# Patient Record
Sex: MALE | Race: WHITE | NOT HISPANIC OR LATINO | Employment: FULL TIME | URBAN - METROPOLITAN AREA
[De-identification: names, ages, dates, MRNs, and addresses within clinical notes are randomized per-mention and may not be internally consistent; named-entity substitution may affect disease eponyms.]

---

## 2017-02-09 ENCOUNTER — ALLSCRIPTS OFFICE VISIT (OUTPATIENT)
Dept: OTHER | Facility: OTHER | Age: 47
End: 2017-02-09

## 2017-02-09 DIAGNOSIS — R03.0 ELEVATED BLOOD PRESSURE READING WITHOUT DIAGNOSIS OF HYPERTENSION: ICD-10-CM

## 2017-02-09 DIAGNOSIS — Z12.5 ENCOUNTER FOR SCREENING FOR MALIGNANT NEOPLASM OF PROSTATE: ICD-10-CM

## 2017-02-09 DIAGNOSIS — M54.9 DORSALGIA: ICD-10-CM

## 2017-02-09 DIAGNOSIS — R73.09 OTHER ABNORMAL GLUCOSE: ICD-10-CM

## 2017-02-09 DIAGNOSIS — R74.8 ABNORMAL LEVELS OF OTHER SERUM ENZYMES: ICD-10-CM

## 2017-02-11 ENCOUNTER — TRANSCRIBE ORDERS (OUTPATIENT)
Dept: LAB | Facility: OTHER | Age: 47
End: 2017-02-11

## 2017-03-18 ENCOUNTER — HOSPITAL ENCOUNTER (OUTPATIENT)
Dept: ULTRASOUND IMAGING | Facility: HOSPITAL | Age: 47
Discharge: HOME | End: 2017-03-18
Payer: COMMERCIAL

## 2017-03-18 DIAGNOSIS — R73.09 OTHER ABNORMAL GLUCOSE: ICD-10-CM

## 2017-03-18 DIAGNOSIS — R74.8 ABNORMAL LEVELS OF OTHER SERUM ENZYMES: ICD-10-CM

## 2017-03-18 PROCEDURE — 76705 ECHO EXAM OF ABDOMEN: CPT

## 2017-03-21 ENCOUNTER — GENERIC CONVERSION - ENCOUNTER (OUTPATIENT)
Dept: OTHER | Facility: OTHER | Age: 47
End: 2017-03-21

## 2017-03-31 ENCOUNTER — ALLSCRIPTS OFFICE VISIT (OUTPATIENT)
Dept: OTHER | Facility: OTHER | Age: 47
End: 2017-03-31

## 2017-09-08 ENCOUNTER — HOSPITAL ENCOUNTER (EMERGENCY)
Facility: HOSPITAL | Age: 47
Discharge: LEFT BEFORE DISCHARGE | End: 2017-09-08
Attending: EMERGENCY MEDICINE
Payer: COMMERCIAL

## 2017-09-08 ENCOUNTER — APPOINTMENT (EMERGENCY)
Dept: RADIOLOGY | Facility: HOSPITAL | Age: 47
End: 2017-09-08
Payer: COMMERCIAL

## 2017-09-08 VITALS
WEIGHT: 315 LBS | DIASTOLIC BLOOD PRESSURE: 77 MMHG | HEIGHT: 74 IN | HEART RATE: 72 BPM | OXYGEN SATURATION: 97 % | SYSTOLIC BLOOD PRESSURE: 143 MMHG | RESPIRATION RATE: 20 BRPM | TEMPERATURE: 98.6 F | BODY MASS INDEX: 40.43 KG/M2

## 2017-09-08 DIAGNOSIS — R07.9 CHEST PAIN: Primary | ICD-10-CM

## 2017-09-08 LAB
ALBUMIN SERPL BCP-MCNC: 4 G/DL (ref 3.5–5)
ALP SERPL-CCNC: 71 U/L (ref 46–116)
ALT SERPL W P-5'-P-CCNC: 180 U/L (ref 12–78)
ANION GAP SERPL CALCULATED.3IONS-SCNC: 9 MMOL/L (ref 4–13)
APTT PPP: 26 SECONDS (ref 23–35)
AST SERPL W P-5'-P-CCNC: 125 U/L (ref 5–45)
ATRIAL RATE: 56 BPM
BASOPHILS # BLD AUTO: 0.03 THOUSANDS/ΜL (ref 0–0.1)
BASOPHILS NFR BLD AUTO: 1 % (ref 0–1)
BILIRUB SERPL-MCNC: 1.2 MG/DL (ref 0.2–1)
BUN SERPL-MCNC: 14 MG/DL (ref 5–25)
CALCIUM SERPL-MCNC: 9 MG/DL (ref 8.3–10.1)
CHLORIDE SERPL-SCNC: 103 MMOL/L (ref 100–108)
CO2 SERPL-SCNC: 26 MMOL/L (ref 21–32)
CREAT SERPL-MCNC: 1.03 MG/DL (ref 0.6–1.3)
DEPRECATED D DIMER PPP: <270 NG/ML (FEU) (ref 0–424)
EOSINOPHIL # BLD AUTO: 0.13 THOUSAND/ΜL (ref 0–0.61)
EOSINOPHIL NFR BLD AUTO: 2 % (ref 0–6)
ERYTHROCYTE [DISTWIDTH] IN BLOOD BY AUTOMATED COUNT: 12.3 % (ref 11.6–15.1)
GFR SERPL CREATININE-BSD FRML MDRD: 85 ML/MIN/1.73SQ M
GLUCOSE SERPL-MCNC: 113 MG/DL (ref 65–140)
HCT VFR BLD AUTO: 41.3 % (ref 36.5–49.3)
HGB BLD-MCNC: 14.8 G/DL (ref 12–17)
INR PPP: 0.98 (ref 0.86–1.16)
LIPASE SERPL-CCNC: 154 U/L (ref 73–393)
LYMPHOCYTES # BLD AUTO: 1.46 THOUSANDS/ΜL (ref 0.6–4.47)
LYMPHOCYTES NFR BLD AUTO: 26 % (ref 14–44)
MCH RBC QN AUTO: 34.6 PG (ref 26.8–34.3)
MCHC RBC AUTO-ENTMCNC: 35.8 G/DL (ref 31.4–37.4)
MCV RBC AUTO: 97 FL (ref 82–98)
MONOCYTES # BLD AUTO: 0.36 THOUSAND/ΜL (ref 0.17–1.22)
MONOCYTES NFR BLD AUTO: 7 % (ref 4–12)
NEUTROPHILS # BLD AUTO: 3.55 THOUSANDS/ΜL (ref 1.85–7.62)
NEUTS SEG NFR BLD AUTO: 64 % (ref 43–75)
NRBC BLD AUTO-RTO: 0 /100 WBCS
P AXIS: 49 DEGREES
PLATELET # BLD AUTO: 149 THOUSANDS/UL (ref 149–390)
PMV BLD AUTO: 10.7 FL (ref 8.9–12.7)
POTASSIUM SERPL-SCNC: 3.9 MMOL/L (ref 3.5–5.3)
PR INTERVAL: 162 MS
PROT SERPL-MCNC: 7.6 G/DL (ref 6.4–8.2)
PROTHROMBIN TIME: 13.2 SECONDS (ref 12.1–14.4)
QRS AXIS: 44 DEGREES
QRSD INTERVAL: 92 MS
QT INTERVAL: 446 MS
QTC INTERVAL: 430 MS
RBC # BLD AUTO: 4.28 MILLION/UL (ref 3.88–5.62)
SODIUM SERPL-SCNC: 138 MMOL/L (ref 136–145)
SPECIMEN SOURCE: NORMAL
T WAVE AXIS: 12 DEGREES
TROPONIN I BLD-MCNC: 0 NG/ML (ref 0–0.08)
TROPONIN I SERPL-MCNC: 0.02 NG/ML
VENTRICULAR RATE: 56 BPM
WBC # BLD AUTO: 5.56 THOUSAND/UL (ref 4.31–10.16)

## 2017-09-08 PROCEDURE — 99285 EMERGENCY DEPT VISIT HI MDM: CPT

## 2017-09-08 PROCEDURE — 85730 THROMBOPLASTIN TIME PARTIAL: CPT | Performed by: EMERGENCY MEDICINE

## 2017-09-08 PROCEDURE — 93005 ELECTROCARDIOGRAM TRACING: CPT | Performed by: EMERGENCY MEDICINE

## 2017-09-08 PROCEDURE — 85379 FIBRIN DEGRADATION QUANT: CPT | Performed by: EMERGENCY MEDICINE

## 2017-09-08 PROCEDURE — 93005 ELECTROCARDIOGRAM TRACING: CPT

## 2017-09-08 PROCEDURE — 83690 ASSAY OF LIPASE: CPT | Performed by: EMERGENCY MEDICINE

## 2017-09-08 PROCEDURE — 71020 HB CHEST X-RAY 2VW FRONTAL&LATL: CPT

## 2017-09-08 PROCEDURE — 36415 COLL VENOUS BLD VENIPUNCTURE: CPT | Performed by: EMERGENCY MEDICINE

## 2017-09-08 PROCEDURE — 80053 COMPREHEN METABOLIC PANEL: CPT | Performed by: EMERGENCY MEDICINE

## 2017-09-08 PROCEDURE — 84484 ASSAY OF TROPONIN QUANT: CPT

## 2017-09-08 PROCEDURE — 84484 ASSAY OF TROPONIN QUANT: CPT | Performed by: EMERGENCY MEDICINE

## 2017-09-08 PROCEDURE — 85025 COMPLETE CBC W/AUTO DIFF WBC: CPT | Performed by: EMERGENCY MEDICINE

## 2017-09-08 PROCEDURE — 85610 PROTHROMBIN TIME: CPT | Performed by: EMERGENCY MEDICINE

## 2017-09-08 RX ORDER — NITROGLYCERIN 0.4 MG/1
0.4 TABLET SUBLINGUAL ONCE
Status: COMPLETED | OUTPATIENT
Start: 2017-09-08 | End: 2017-09-08

## 2017-09-08 RX ORDER — OMEPRAZOLE 10 MG/1
10 CAPSULE, DELAYED RELEASE ORAL DAILY
COMMUNITY
End: 2018-10-13 | Stop reason: HOSPADM

## 2017-09-08 RX ORDER — ASPIRIN 81 MG/1
324 TABLET, CHEWABLE ORAL ONCE
Status: COMPLETED | OUTPATIENT
Start: 2017-09-08 | End: 2017-09-08

## 2017-09-08 RX ADMIN — ASPIRIN 81 MG 324 MG: 81 TABLET ORAL at 12:30

## 2017-09-08 RX ADMIN — NITROGLYCERIN 0.4 MG: 0.4 TABLET SUBLINGUAL at 12:32

## 2018-01-11 NOTE — RESULT NOTES
Message   Liver US shows enlarged and fatty liver  Diet and exercise  When will pt be going for his labs? Verified Results  US LIVER [id number] 09:08AM [facility] Order Number: [id number]    - Patient Instructions: To schedule this appointment, please contact Central Scheduling at [phone]  Test Name Result Flag Reference   US LIVER (Report)     RIGHT UPPER QUADRANT ULTRASOUND     INDICATION: Elevated LFTs  COMPARISON: None  TECHNIQUE:  Real-time ultrasound of the right upper quadrant was performed with a curvilinear transducer with both volumetric sweeps and still imaging techniques  FINDINGS:     PANCREAS: Portions of the pancreas are obscured by bowel gas  Visualized portions of the pancreas are unremarkable  AORTA AND IVC: Visualized portions are normal for patient age  LIVER:   Size: Mildly enlarged  The liver measures 17 8 cm in the midclavicular line  Contour: Surface contour is smooth  Parenchyma: There is moderate diffuse increased echogenicity with smooth echotexture and acoustic beam attenuation  Most consistent with moderate hepatic steatosis  No evidence of suspicious mass  Limited imaging of the main portal vein shows it to be patent and hepatopedal       BILIARY:   The gallbladder is normal in caliber  No wall thickening or pericholecystic fluid  No stones or sludge identified  No sonographic Hoffman's sign  No intrahepatic biliary dilatation  CBD measures 3 mm  No choledocholithiasis  KIDNEY:    Right kidney measures 11 4 x 5 5 cm  Within normal limits  ASCITES:  None  IMPRESSION:     No acute intra-abdominal abnormality  No gallstone/sludge, pericholecystic fluid or wall thickening  Mildly enlarged fatty liver         Workstation performed: [id number]     Signed by:   Corazon Todd MD   3/20/17

## 2018-01-12 VITALS
SYSTOLIC BLOOD PRESSURE: 140 MMHG | WEIGHT: 315 LBS | DIASTOLIC BLOOD PRESSURE: 88 MMHG | RESPIRATION RATE: 16 BRPM | HEART RATE: 64 BPM

## 2018-01-14 VITALS
SYSTOLIC BLOOD PRESSURE: 148 MMHG | DIASTOLIC BLOOD PRESSURE: 90 MMHG | HEIGHT: 73 IN | RESPIRATION RATE: 16 BRPM | HEART RATE: 60 BPM | BODY MASS INDEX: 41.75 KG/M2 | WEIGHT: 315 LBS

## 2018-05-20 ENCOUNTER — OFFICE VISIT (OUTPATIENT)
Dept: URGENT CARE | Facility: CLINIC | Age: 48
End: 2018-05-20
Payer: COMMERCIAL

## 2018-05-20 VITALS
DIASTOLIC BLOOD PRESSURE: 90 MMHG | WEIGHT: 315 LBS | SYSTOLIC BLOOD PRESSURE: 150 MMHG | BODY MASS INDEX: 40.43 KG/M2 | TEMPERATURE: 98.2 F | OXYGEN SATURATION: 98 % | RESPIRATION RATE: 16 BRPM | HEIGHT: 74 IN | HEART RATE: 72 BPM

## 2018-05-20 DIAGNOSIS — J20.9 ACUTE BRONCHITIS, UNSPECIFIED ORGANISM: Primary | ICD-10-CM

## 2018-05-20 PROCEDURE — 99213 OFFICE O/P EST LOW 20 MIN: CPT | Performed by: PHYSICIAN ASSISTANT

## 2018-05-20 RX ORDER — BENZONATATE 100 MG/1
100 CAPSULE ORAL 3 TIMES DAILY PRN
Qty: 20 CAPSULE | Refills: 0 | Status: SHIPPED | OUTPATIENT
Start: 2018-05-20 | End: 2018-07-30 | Stop reason: ALTCHOICE

## 2018-05-20 RX ORDER — AZITHROMYCIN 250 MG/1
TABLET, FILM COATED ORAL
Qty: 6 TABLET | Refills: 0 | Status: SHIPPED | OUTPATIENT
Start: 2018-05-20 | End: 2018-05-24

## 2018-05-20 RX ORDER — ALBUTEROL SULFATE 90 UG/1
2 AEROSOL, METERED RESPIRATORY (INHALATION) EVERY 6 HOURS PRN
Qty: 1 INHALER | Refills: 0 | Status: SHIPPED | OUTPATIENT
Start: 2018-05-20 | End: 2018-05-27

## 2018-05-20 NOTE — PATIENT INSTRUCTIONS
1  Bronchitis  -Take antibiotic as directed with food  -Take tessalon perles as directed  -Use inhaler every 4-6 hours as needed  -Tylenol/Motrin  -Increase fluids  -Follow-up with PCP within 3-5 days  You also need to have your blood pressure re-checked by your PCP  Go to ER with worsening symptoms, fever, chest pain, shortness of breath, or any signs of distress    Acute Bronchitis   AMBULATORY CARE:   Acute bronchitis  is swelling and irritation in the air passages of your lungs  This irritation may cause you to cough or have other breathing problems  Acute bronchitis often starts because of another illness, such as a cold or the flu  The illness spreads from your nose and throat to your windpipe and airways  Bronchitis is often called a chest cold  Acute bronchitis lasts about 3 to 6 weeks and is usually not a serious illness  Your cough can last for several weeks  You may have any of the following symptoms:   · A cough with sputum that may be clear, yellow, or green    · Feeling more tired than usual, and body aches    · A fever and chills    · Wheezing when you breathe    · A tight chest or pain when you breathe or cough  Seek care immediately if:   · You cough up blood  · Your lips or fingernails turn blue  · You feel like you are not getting enough air when you breathe  Contact your healthcare provider if:   · You have a fever  · Your breathing problems do not go away or get worse  · Your cough does not get better within 4 weeks  · You have questions or concerns about your condition or care  Self-care:   · Get more rest   Rest helps your body to heal  Slowly start to do more each day  Rest when you feel it is needed  · Avoid irritants in the air  Avoid chemicals, fumes, and dust  Wear a face mask if you must work around dust or fumes  Stay inside on days when air pollution levels are high  If you have allergies, stay inside when pollen counts are high   Do not use aerosol products, such as spray-on deodorant, bug spray, and hair spray  · Do not smoke or be around others who smoke  Nicotine and other chemicals in cigarettes and cigars damages the cilia that move mucus out of your lungs  Ask your healthcare provider for information if you currently smoke and need help to quit  E-cigarettes or smokeless tobacco still contain nicotine  Talk to your healthcare provider before you use these products  · Drink liquids as directed  Liquids help keep your air passages moist and help you cough up mucus  You may need to drink more liquids when you have acute bronchitis  Ask how much liquid to drink each day and which liquids are best for you  · Use a humidifier or vaporizer  Use a cool mist humidifier or a vaporizer to increase air moisture in your home  This may make it easier for you to breathe and help decrease your cough  Prevent acute bronchitis by doing the following:   · Get the vaccinations you need  Ask your healthcare provider if you should get vaccinated against the flu or pneumonia  · Prevent the spread of germs  You can decrease your risk of acute bronchitis and other illnesses by doing the following:     [facility] [place] often with soap and water  Carry germ-killing hand lotion or gel with you  You can use the lotion or gel to clean your hands when soap and water are not available  ¨ Do not touch your eyes, nose, or mouth unless you have washed your hands first     ¨ Always cover your mouth when you cough to prevent the spread of germs  It is best to cough into a tissue or your shirt sleeve instead of into your hand  Ask those around you cover their mouths when they cough  ¨ Try to avoid people who have a cold or the flu  If you are sick, stay away from others as much as possible  Medicines: Your healthcare provider may  give you any of the following:  · Ibuprofen or acetaminophen  are medicines that help lower your fever   They are available without a doctor's order  Ask your healthcare provider which medicine is right for you  Ask how much to take and how often to take it  Follow directions  These medicines can cause stomach bleeding if not taken correctly  Ibuprofen can cause kidney damage  Do not take ibuprofen if you have kidney disease, an ulcer, or allergies to aspirin  Acetaminophen can cause liver damage  Do not take more than 4,000 milligrams in 24 hours  · Decongestants  help loosen mucus in your lungs and make it easier to cough up  This can help you breathe easier  · Cough suppressants  decrease your urge to cough  If your cough produces mucus, do not take a cough suppressant unless your healthcare provider tells you to  Your healthcare provider may suggest that you take a cough suppressant at night so you can rest     · Inhalers  may be given  Your healthcare provider may give you one or more inhalers to help you breathe easier and cough less  An inhaler gives your medicine to open your airways  Ask your healthcare provider to show you how to use your inhaler correctly  Follow up with your healthcare provider as directed:  Write down questions you have so you will remember to ask them during your follow-up visits  © 2017 [facility] Information is for End User's use only and may not be sold, redistributed or otherwise used for commercial purposes  All illustrations and images included in CareNotes® are the copyrighted property of A D A M [employer] Inc  or [employer]  The above information is an  only  It is not intended as medical advice for individual conditions or treatments  Talk to your doctor, nurse or pharmacist before following any medical regimen to see if it is safe and effective for you

## 2018-05-20 NOTE — PROGRESS NOTES
[facility]        NAME: Sunday Agrawal is a 51 y o  male  : 1970    MRN: [id number]  DATE: May 20, 2018  TIME: 9:10 AM    Assessment and Plan   Acute bronchitis, unspecified organism [J20 9]  1  Acute bronchitis, unspecified organism  azithromycin (ZITHROMAX) 250 mg tablet    albuterol (PROVENTIL HFA,VENTOLIN HFA) 90 mcg/act inhaler    benzonatate (TESSALON PERLES) 100 mg capsule         Patient Instructions     1  Bronchitis  -Take antibiotic as directed with food  -Take tessalon perles as directed  -Use inhaler every 4-6 hours as needed  -Tylenol/Motrin  -Increase fluids  -Follow-up with PCP within 3-5 days  You also need to have your blood pressure re-checked by your PCP  Go to ER with worsening symptoms, fever, chest pain, shortness of breath, or any signs of distress    Chief Complaint     Chief Complaint   Patient presents with    Cough     x 4 days, green/brown, tried nyquil/coldezz    Sore Throat     slight    sinus pressure     tried netti-pot    Nasal Congestion         History of Present Illness       The patient presents today for an evaluation of cold symptoms that started on Wednesday  The patient admits to nasal congestion, ears feeling clogged, sinus pain/pressure, sore throat, and cough  The patient states that his cough has gotten worse and that he is bringing up green/yellow mucous  The patient has been taking nyquil and OTC decongestants without relief  The patient's wife has similar symptoms  The patient's daughter also recently had bronchitis  Review of Systems   Review of Systems   Constitutional: Negative for chills and fever  HENT: Positive for congestion, ear pain, postnasal drip, rhinorrhea, sinus pain, sinus pressure and sore throat  Respiratory: Positive for cough  Negative for shortness of breath  Cardiovascular: Negative for chest pain  Neurological: Negative for headaches           Current Medications       Current Outpatient Prescriptions:   omeprazole (PriLOSEC) 10 mg delayed release capsule, Take 10 mg by mouth daily, Disp: , Rfl:     albuterol (PROVENTIL HFA,VENTOLIN HFA) 90 mcg/act inhaler, Inhale 2 puffs every 6 (six) hours as needed for wheezing for up to 7 days, Disp: 1 Inhaler, Rfl: 0    azithromycin (ZITHROMAX) 250 mg tablet, Take 2 tablets today then 1 tablet daily x 4 days, Disp: 6 tablet, Rfl: 0    benzonatate (TESSALON PERLES) 100 mg capsule, Take 1 capsule (100 mg total) by mouth 3 (three) times a day as needed for cough, Disp: 20 capsule, Rfl: 0    Escitalopram Oxalate (LEXAPRO PO), Take by mouth daily at bedtime, Disp: , Rfl:     Current Allergies     Allergies as of 05/20/2018 - Reviewed 05/20/2018   Allergen Reaction Noted    Ciprofloxacin Hives 09/08/2017            The following portions of the patient's history were reviewed and updated as appropriate: allergies, current medications, past family history, past medical history, past social history, past surgical history and problem list      Past Medical History:   Diagnosis Date    GERD (gastroesophageal reflux disease)        Past Surgical History:   Procedure Laterality Date    SKIN CANCER EXCISION         Family History   Problem Relation Age of Onset    Hypertension Mother     Hyperlipidemia Mother     Cancer Mother     Hypertension Father     Hyperlipidemia Father          Medications have been verified  Objective   /90   Pulse 72   Temp 98 2 °F (36 8 °C) (Tympanic)   Resp 16   Ht 6' 2" (1 88 m)   Wt (!) 152 kg (334 lb)   SpO2 98%   BMI 42 88 kg/m²        Physical Exam     Physical Exam   Constitutional: He is oriented to person, place, and time  He appears well-developed and well-nourished  No distress  HENT:   Right Ear: Tympanic membrane and external ear normal    Left Ear: Tympanic membrane and external ear normal    Nose: Nose normal  Right sinus exhibits no maxillary sinus tenderness and no frontal sinus tenderness   Left sinus exhibits no maxillary sinus tenderness and no frontal sinus tenderness  Mouth/Throat: Uvula is midline, oropharynx is clear and moist and mucous membranes are normal    Eyes: Conjunctivae and EOM are normal  Pupils are equal, round, and reactive to light  Neck: Normal range of motion  Neck supple  Cardiovascular: Normal rate, regular rhythm and normal heart sounds  Pulmonary/Chest: Effort normal  He has wheezes in the right lower field and the left lower field  He has rhonchi in the right lower field and the left lower field  Lymphadenopathy:     He has no cervical adenopathy  Neurological: He is alert and oriented to person, place, and time  Skin: Skin is warm and dry  Psychiatric: He has a normal mood and affect  Nursing note and vitals reviewed

## 2018-05-23 ENCOUNTER — TELEPHONE (OUTPATIENT)
Dept: FAMILY MEDICINE CLINIC | Facility: MEDICAL CENTER | Age: 48
End: 2018-05-23

## 2018-05-23 ENCOUNTER — OFFICE VISIT (OUTPATIENT)
Dept: FAMILY MEDICINE CLINIC | Facility: MEDICAL CENTER | Age: 48
End: 2018-05-23
Payer: COMMERCIAL

## 2018-05-23 ENCOUNTER — APPOINTMENT (OUTPATIENT)
Dept: RADIOLOGY | Facility: MEDICAL CENTER | Age: 48
End: 2018-05-23
Payer: COMMERCIAL

## 2018-05-23 VITALS
WEIGHT: 315 LBS | BODY MASS INDEX: 42.06 KG/M2 | TEMPERATURE: 98.4 F | OXYGEN SATURATION: 98 % | HEART RATE: 74 BPM | SYSTOLIC BLOOD PRESSURE: 140 MMHG | DIASTOLIC BLOOD PRESSURE: 94 MMHG

## 2018-05-23 DIAGNOSIS — R05.9 COUGH: Primary | ICD-10-CM

## 2018-05-23 DIAGNOSIS — R04.2 HEMOPTYSIS: ICD-10-CM

## 2018-05-23 DIAGNOSIS — R07.9 CHEST PAIN, UNSPECIFIED TYPE: Primary | ICD-10-CM

## 2018-05-23 DIAGNOSIS — R07.9 CHEST PAIN, UNSPECIFIED TYPE: ICD-10-CM

## 2018-05-23 DIAGNOSIS — R94.31 ABNORMAL FINDING ON EKG: ICD-10-CM

## 2018-05-23 DIAGNOSIS — I10 ESSENTIAL HYPERTENSION: ICD-10-CM

## 2018-05-23 PROBLEM — F41.8 DEPRESSION WITH ANXIETY: Status: ACTIVE | Noted: 2017-02-09

## 2018-05-23 PROBLEM — E66.9 OBESITY: Status: ACTIVE | Noted: 2017-03-31

## 2018-05-23 PROBLEM — R03.0 ELEVATED BP WITHOUT DIAGNOSIS OF HYPERTENSION: Status: ACTIVE | Noted: 2017-02-09

## 2018-05-23 PROBLEM — Z99.89 OSA ON CPAP: Status: ACTIVE | Noted: 2017-02-09

## 2018-05-23 PROBLEM — R74.8 ELEVATED LIVER ENZYMES: Status: ACTIVE | Noted: 2017-02-14

## 2018-05-23 PROBLEM — R16.0 ENLARGED LIVER: Status: ACTIVE | Noted: 2017-03-21

## 2018-05-23 PROBLEM — R03.0 ELEVATED BP WITHOUT DIAGNOSIS OF HYPERTENSION: Status: RESOLVED | Noted: 2017-02-09 | Resolved: 2018-05-23

## 2018-05-23 PROBLEM — R73.09 ELEVATED GLUCOSE: Status: ACTIVE | Noted: 2017-02-14

## 2018-05-23 PROBLEM — K76.0 FATTY LIVER: Status: ACTIVE | Noted: 2017-03-21

## 2018-05-23 PROBLEM — K21.9 ACID REFLUX: Status: ACTIVE | Noted: 2017-03-31

## 2018-05-23 PROBLEM — G47.33 OSA ON CPAP: Status: ACTIVE | Noted: 2017-02-09

## 2018-05-23 PROCEDURE — 71046 X-RAY EXAM CHEST 2 VIEWS: CPT

## 2018-05-23 PROCEDURE — 99213 OFFICE O/P EST LOW 20 MIN: CPT | Performed by: FAMILY MEDICINE

## 2018-05-23 PROCEDURE — 93000 ELECTROCARDIOGRAM COMPLETE: CPT | Performed by: FAMILY MEDICINE

## 2018-05-23 PROCEDURE — 71100 X-RAY EXAM RIBS UNI 2 VIEWS: CPT

## 2018-05-23 NOTE — TELEPHONE ENCOUNTER
Chest x-ray and left rib x-ray reviewed  Chest x-ray is clear and there is no fracture on left rib x-ray  No additional medication at this time

## 2018-05-23 NOTE — TELEPHONE ENCOUNTER
seen at [facility] Sunday dx with bronchitis  Put on zpack  Not much better  Now has some blood in sputum (only in am, bright red)  Some pain now behind sternum only when coughing  Bring in today? Also prescribed an inhaler and tessalon perls

## 2018-05-23 NOTE — PROGRESS NOTES
Assessment/Plan:    No problem-specific Assessment & Plan notes found for this encounter  Diagnoses and all orders for this visit:    Chest pain, unspecified type  -     POCT ECG  -     XR ribs 2 vw left; Future  Chest pain is likely pleuritic  In office EKG did not have any acute changes and therefore it is unlikely to be cardiac in origin  I did check a rib x-ray which was negative for fracture  Hemoptysis  -     XR chest pa & lateral; Future  Likely due to lung irritation from coughing  I did a chest x-ray and there was no active pulmonary disease  Patient instructed to continues antibiotics, his inhaler and his cough medication that were all prescribed by the urgent care  Abnormal finding on EKG  -     Ambulatory referral to Cardiology; Future  Patient's EKG from today is changed from his prior one from 2017  I suspect this is related to uncontrolled high blood pressure  I will have him see Cardiology for further evaluation  Essential hypertension  Blood pressure is not well controlled  Risks of high blood pressure discussed including risk of heart attack and stroke  I recommended patient start blood pressure medication but he declined at this time  He will wait to see what Cardiology has to offer  Follow-up in two weeks if symptoms persist or sooner if needed  Subjective:      Patient ID: Deepika Lynch is a 51 y o  male  Patient presents for follow-up he was recently seen in the urgent care for a cough and diagnosed with a bronchitis  He was placed on azithromycin, and albuterol inhaler and Tessalon Perles  He states his cough persists  Is been one week  He started to cough up blood three days ago  He started to have left-sided chest pain yesterday which only occurs when he coughs  He also has associated nasal congestion, headaches and ear fullness          The following portions of the patient's history were reviewed and updated as appropriate: allergies, current medications and problem list     Review of Systems   Constitutional: Negative for fever  Respiratory: Positive for cough  Negative for shortness of breath  Cardiovascular: Positive for chest pain  Objective:      /94 (BP Location: Left arm, Patient Position: Sitting, Cuff Size: Large)   Pulse 74   Temp 98 4 °F (36 9 °C)   Wt (!) 149 kg (327 lb 9 6 oz)   SpO2 98%   BMI 42 06 kg/m²          Physical Exam   Constitutional: He appears well-developed and well-nourished  Cardiovascular: Normal rate, regular rhythm and normal heart sounds      Pulmonary/Chest: Effort normal and breath sounds normal

## 2018-05-23 NOTE — TELEPHONE ENCOUNTER
I spoke with Jigar Alvarez wife, she is aware  Wants to know if a steroid would help, the tessalon pearls dont help him

## 2018-05-24 ENCOUNTER — OFFICE VISIT (OUTPATIENT)
Dept: CARDIOLOGY CLINIC | Facility: MEDICAL CENTER | Age: 48
End: 2018-05-24
Payer: COMMERCIAL

## 2018-05-24 VITALS
HEART RATE: 68 BPM | HEIGHT: 74 IN | WEIGHT: 315 LBS | DIASTOLIC BLOOD PRESSURE: 72 MMHG | BODY MASS INDEX: 40.43 KG/M2 | SYSTOLIC BLOOD PRESSURE: 146 MMHG | OXYGEN SATURATION: 97 %

## 2018-05-24 DIAGNOSIS — R94.31 ABNORMAL FINDING ON EKG: Primary | ICD-10-CM

## 2018-05-24 DIAGNOSIS — I10 ESSENTIAL HYPERTENSION: ICD-10-CM

## 2018-05-24 DIAGNOSIS — R07.89 OTHER CHEST PAIN: ICD-10-CM

## 2018-05-24 PROCEDURE — 93000 ELECTROCARDIOGRAM COMPLETE: CPT | Performed by: INTERNAL MEDICINE

## 2018-05-24 PROCEDURE — 99205 OFFICE O/P NEW HI 60 MIN: CPT | Performed by: INTERNAL MEDICINE

## 2018-05-24 RX ORDER — PREDNISONE 20 MG/1
40 TABLET ORAL DAILY
Qty: 10 TABLET | Refills: 0 | Status: SHIPPED | OUTPATIENT
Start: 2018-05-24 | End: 2018-05-29

## 2018-05-24 NOTE — TELEPHONE ENCOUNTER
Steroids may help  I faxed in prednisone 40 mg daily for five days  Patient will take 2, 20 mg tablets to make the 40 mg dose

## 2018-05-24 NOTE — PROGRESS NOTES
Cardiology Consultation     Sanjay Glasgow  [id number]  1970  [address] [facility]  [address] [facility] [address]    1  Abnormal finding on EKG  Ambulatory referral to Cardiology    POCT ECG   2  Essential hypertension     3  Other chest pain       HPI: Patient is here for evaluation and management of chest pain  He was noted to have an abnormal finding on his EKG from his PCPs office yesterday compared with prior EKG in 2017  He does note new onset of chest pain  He has been battling bronchitis  He does not have any shortness of breath, diaphoresis, lightheadedness, edema, orthopnea, or PND  Patient Active Problem List   Diagnosis    Acid reflux    Depression with anxiety    Elevated glucose    Elevated liver enzymes    Enlarged liver    Fatty liver    Obesity    KRISTEN on CPAP    Essential hypertension    Other chest pain     Past Medical History:   Diagnosis Date    GERD (gastroesophageal reflux disease)      Social History     Social History    Marital status: /Civil Union     Spouse name: N/A    Number of children: N/A    Years of education: N/A     Occupational History    Not on file       Social History Main Topics    Smoking status: Former Smoker    Smokeless tobacco: Never Used    Alcohol use Yes      Comment: socially    Drug use: No    Sexual activity: Not on file     Other Topics Concern    Not on file     Social History Narrative    No narrative on file      Family History   Problem Relation Age of Onset    Hypertension Mother     Hyperlipidemia Mother     Cancer Mother     Hypertension Father     Hyperlipidemia Father    Father and GF with stents/MI; HTN/HLD with both parents  Past Surgical History:   Procedure Laterality Date    SKIN CANCER EXCISION         Current Outpatient Prescriptions:     albuterol (PROVENTIL HFA,VENTOLIN HFA) 90 mcg/act inhaler, Inhale 2 puffs every 6 (six) hours as needed for wheezing for up to 7 days, Disp: 1 Inhaler, Rfl: 0    azithromycin (ZITHROMAX) 250 mg tablet, Take 2 tablets today then 1 tablet daily x 4 days, Disp: 6 tablet, Rfl: 0    benzonatate (TESSALON PERLES) 100 mg capsule, Take 1 capsule (100 mg total) by mouth 3 (three) times a day as needed for cough, Disp: 20 capsule, Rfl: 0    omeprazole (PriLOSEC) 10 mg delayed release capsule, Take 10 mg by mouth daily, Disp: , Rfl:     predniSONE 20 mg tablet, Take 2 tablets (40 mg total) by mouth daily for 5 days, Disp: 10 tablet, Rfl: 0  Allergies   Allergen Reactions    Ciprofloxacin Hives     Vitals:    05/24/18 1440   BP: 146/72   BP Location: Left arm   Patient Position: Sitting   Cuff Size: Adult   Pulse: 68   SpO2: 97%   Weight: (!) 149 kg (328 lb 11 2 oz)   Height: 6' 2 02" (1 88 m)       Labs:  No visits with results within 6 Month(s) from this visit     Latest known visit with results is:   Admission on 09/08/2017, Discharged on 09/08/2017   Component Date Value    Sodium 09/08/2017 138     Potassium 09/08/2017 3 9     Chloride 09/08/2017 103     CO2 09/08/2017 26     Anion Gap 09/08/2017 9     BUN 09/08/2017 14     Creatinine 09/08/2017 1 03     Glucose 09/08/2017 113     Calcium 09/08/2017 9 0     AST 09/08/2017 125*    ALT 09/08/2017 180*    Alkaline Phosphatase 09/08/2017 71     Total Protein 09/08/2017 7 6     Albumin 09/08/2017 4 0     Total Bilirubin 09/08/2017 1 20*    eGFR 09/08/2017 85     WBC 09/08/2017 5 56     RBC 09/08/2017 4 28     Hemoglobin 09/08/2017 14 8     Hematocrit 09/08/2017 41 3     MCV 09/08/2017 97     MCH 09/08/2017 34 6*    MCHC 09/08/2017 35 8     RDW 09/08/2017 12 3     MPV 09/08/2017 10 7     Platelets 67/89/1654 149     nRBC 09/08/2017 0     Neutrophils Relative 09/08/2017 64     Lymphocytes Relative 09/08/2017 26     Monocytes Relative 09/08/2017 7     Eosinophils Relative 09/08/2017 2     Basophils Relative 09/08/2017 1     Neutrophils Absolute 09/08/2017 3 55     Lymphocytes Absolute 09/08/2017 1 46     Monocytes Absolute 09/08/2017 0 36     Eosinophils Absolute 09/08/2017 0 13     Basophils Absolute 09/08/2017 0 03     Troponin I 09/08/2017 0 02     Protime 09/08/2017 13 2     INR 09/08/2017 0 98     PTT 09/08/2017 26     Lipase 09/08/2017 154     D-Dimer, Quant 09/08/2017 <270     POC Troponin I 09/08/2017 0 00     Specimen Type 09/08/2017 VENOUS     Ventricular Rate 09/08/2017 56     Atrial Rate 09/08/2017 56     DE Interval 09/08/2017 162     QRSD Interval 09/08/2017 92     QT Interval 09/08/2017 446     QTC Interval 09/08/2017 430     P Axis 09/08/2017 49     QRS Axis 09/08/2017 44     T Wave Axis 09/08/2017 12      No results found for: CHOL, TRIG, HDL, LDLDIRECT  Imaging: Xr Chest Pa & Lateral    Result Date: 5/23/2018  Narrative: CHEST INDICATION:   R04 2: Hemoptysis  COMPARISON:  9/8/2017 EXAM PERFORMED/VIEWS:  XR CHEST PA & LATERAL FINDINGS: Cardiomediastinal silhouette appears unremarkable  The lungs are clear  No pneumothorax or pleural effusion  Osseous structures appear within normal limits for patient age  Impression: No acute cardiopulmonary disease  Workstation performed: [id number]     Xr Ribs 2 Vw Left    Result Date: 5/23/2018  Narrative: LEFT RIBS INDICATION:   R07 9: Chest pain, unspecified  COMPARISON:  5/23/2018  VIEWS:  XR RIBS 2 VW LEFT FINDINGS: There is no fracture or pathologic bone lesion  Soft tissues are unremarkable  The visualized left hemithorax is unremarkable  There is no pneumothorax  Impression: No fracture  Workstation performed: [id number]       Review of Systems:  Review of Systems   Constitutional: Negative for activity change, appetite change, fatigue and fever  HENT: Negative for nosebleeds and sore throat  Eyes: Negative for photophobia and visual disturbance  Respiratory: Positive for cough and chest tightness   Negative for shortness of breath and wheezing  Cardiovascular: Negative for chest pain, palpitations and leg swelling  Gastrointestinal: Negative for abdominal pain, diarrhea, nausea and vomiting  Endocrine: Negative for polyuria  Genitourinary: Negative for dysuria, frequency and hematuria  Musculoskeletal: Negative for arthralgias, back pain and gait problem  Skin: Negative for pallor and rash  Neurological: Negative for dizziness, syncope, speech difficulty and light-headedness  Hematological: Does not bruise/bleed easily  Psychiatric/Behavioral: Negative for agitation, behavioral problems and confusion  Physical Exam:  Physical Exam   Constitutional: He is oriented to person, place, and time  He appears well-developed and well-nourished  No distress  HENT:   Head: Normocephalic and atraumatic  Nose: Nose normal    Eyes: EOM are normal  Pupils are equal, round, and reactive to light  No scleral icterus  Neck: Normal range of motion  Neck supple  No JVD present  Cardiovascular: Normal rate, regular rhythm, S1 normal and S2 normal   Exam reveals no gallop, no S3, no distant heart sounds and no friction rub  No murmur heard  No systolic murmur is present   Pulmonary/Chest: Effort normal and breath sounds normal  No respiratory distress  He has no wheezes  He has no rales  Abdominal: Soft  Bowel sounds are normal  He exhibits no distension  Musculoskeletal: He exhibits no edema or deformity  Neurological: He is alert and oriented to person, place, and time  No cranial nerve deficit  Skin: Skin is warm and dry  He is not diaphoretic  No erythema  Psychiatric: He has a normal mood and affect  His behavior is normal    Vitals reviewed  EKG:  Normal sinus rhythm  Normal ECG    Discussion/Summary:  Chest pain: repeated EKG today, which was normal   I'm unclear as to what the abnormality on EKG done yesterday was since I am unable to view that EKG    With risk factors for CAD and symptoms - will screen with treadmill stress testing  Will also check an echo to evaluate for structural heart disease  Uncontrolled HTN:  Perhaps related to steroid use  Today's value is not terrible, continue to watch

## 2018-05-24 NOTE — LETTER
May 24, 2018     Kristen Holman, DO  [address]  [facility] [address]    Patient: Asa More   YOB: 1970   Date of Visit: 5/24/2018       Dear Dr Daisy Knox: Thank you for referring Asa More to me for evaluation  Below are my notes for this consultation  If you have questions, please do not hesitate to call me  I look forward to following your patient along with you           Sincerely,        Camryn Cueva MD        CC: No Recipients

## 2018-05-24 NOTE — PATIENT INSTRUCTIONS
Chest Pain   AMBULATORY CARE:   Chest pain  can be caused by a range of conditions, from not serious to life-threatening  It may be caused by a heart attack or a blood clot in your lungs  Sometimes chest pain or pressure is caused by poor blood flow to your heart (angina)  Infection, inflammation, or a fracture in the bones or cartilage in your chest can cause pain or discomfort  Chest pain can also be a symptom of a digestive problem, such as acid reflux or a stomach ulcer  An anxiety attack or a strong emotion such as anger can also cause chest pain  It is important to follow up with your healthcare provider to find the cause of your chest pain  Common symptoms you may have with chest pain:   · Fever or sweating     · Nausea or vomiting     · Shortness of breath     · Discomfort or pressure that spreads from your chest to your back, jaw, or arm     · A racing or slow heartbeat     · Feeling weak, tired, or faint  Call 911 if:   · You have any of the following signs of a heart attack:      ¨ Squeezing, pressure, or pain in your chest that lasts longer than 5 minutes or returns    ¨ Discomfort or pain in your back, neck, jaw, stomach, or arm     ¨ Trouble breathing    ¨ Nausea or vomiting    ¨ Lightheadedness or a sudden cold sweat, especially with chest pain or trouble breathing    Seek care immediately if:   · You have chest discomfort that gets worse, even with medicine  · You cough or vomit blood  · Your bowel movements are black or bloody  · You cannot stop vomiting, or it hurts to swallow  Contact your healthcare provider if:   · You have questions or concerns about your condition or care  Treatment for chest pain  may include medicine to treat your symptoms while your healthcare provider finds the cause of your chest pain  · Medicines  may be given to treat the cause of your chest pain  Examples include pain medicine, anxiety medicine, or medicines to increase blood flow to your heart  · Do not take certain medicines without asking your healthcare provider first   These include NSAIDs, herbal or vitamin supplements, or hormones (estrogen or progestin)  Follow up with your healthcare provider within 72 hours, or as directed: You may need to return for more tests to find the cause of your chest pain  You may be referred to a specialist, such as a cardiologist or gastroenterologist  Write down your questions so you remember to ask them during your visits  Healthy living tips: The following are general healthy guidelines  If your chest pain is caused by a heart problem, your healthcare provider will give you specific guidelines to follow  · Do not smoke  Nicotine and other chemicals in cigarettes and cigars can cause lung and heart damage  Ask your healthcare provider for information if you currently smoke and need help to quit  E-cigarettes or smokeless tobacco still contain nicotine  Talk to your healthcare provider before you use these products  · Eat a variety of healthy, low-fat foods  Healthy foods include fruits, vegetables, whole-grain breads, low-fat dairy products, beans, lean meats, and fish  Ask for more information about a heart healthy diet  · Ask about activity  Your healthcare provider will tell you which activities to limit or avoid  Ask when you can drive, return to work, and have sex  Ask about the best exercise plan for you  · Maintain a healthy weight  Ask your healthcare provider how much you should weigh  Ask him or her to help you create a weight loss plan if you are overweight  · Get the flu and pneumonia vaccines  All adults should get the influenza (flu) vaccine  Get it every year as soon as it becomes available  The pneumococcal vaccine is given to adults aged 72 years or older  The vaccine is given every 5 years to prevent pneumococcal disease, such as pneumonia    © 2017 [employer] Information is for End User's use only and may not be sold, redistributed or otherwise used for commercial purposes  All illustrations and images included in CareNotes® are the copyrighted property of [employer]  or Jc [employer]  The above information is an  only  It is not intended as medical advice for individual conditions or treatments  Talk to your doctor, nurse or pharmacist before following any medical regimen to see if it is safe and effective for you

## 2018-07-11 ENCOUNTER — HOSPITAL ENCOUNTER (OUTPATIENT)
Dept: NON INVASIVE DIAGNOSTICS | Facility: CLINIC | Age: 48
Discharge: HOME | End: 2018-07-11
Payer: COMMERCIAL

## 2018-07-11 DIAGNOSIS — I10 ESSENTIAL HYPERTENSION: ICD-10-CM

## 2018-07-11 DIAGNOSIS — R07.89 OTHER CHEST PAIN: ICD-10-CM

## 2018-07-11 DIAGNOSIS — R94.31 ABNORMAL FINDING ON EKG: ICD-10-CM

## 2018-07-11 PROCEDURE — 93306 TTE W/DOPPLER COMPLETE: CPT

## 2018-07-11 PROCEDURE — 93018 CV STRESS TEST I&R ONLY: CPT | Performed by: INTERNAL MEDICINE

## 2018-07-11 PROCEDURE — 93017 CV STRESS TEST TRACING ONLY: CPT

## 2018-07-11 PROCEDURE — 93016 CV STRESS TEST SUPVJ ONLY: CPT | Performed by: INTERNAL MEDICINE

## 2018-07-11 PROCEDURE — 93306 TTE W/DOPPLER COMPLETE: CPT | Performed by: INTERNAL MEDICINE

## 2018-07-12 LAB
CHEST PAIN STATEMENT: NORMAL
MAX DIASTOLIC BP: 82 MMHG
MAX HEART RATE: 160 BPM
MAX PREDICTED HEART RATE: 171 BPM
MAX. SYSTOLIC BP: 188 MMHG
PROTOCOL NAME: NORMAL
REASON FOR TERMINATION: NORMAL
TARGET HR FORMULA: NORMAL
TIME IN EXERCISE PHASE: NORMAL

## 2018-07-30 ENCOUNTER — OFFICE VISIT (OUTPATIENT)
Dept: CARDIOLOGY CLINIC | Facility: MEDICAL CENTER | Age: 48
End: 2018-07-30
Payer: COMMERCIAL

## 2018-07-30 VITALS
DIASTOLIC BLOOD PRESSURE: 74 MMHG | WEIGHT: 315 LBS | HEART RATE: 80 BPM | BODY MASS INDEX: 40.43 KG/M2 | SYSTOLIC BLOOD PRESSURE: 126 MMHG | OXYGEN SATURATION: 98 % | HEIGHT: 74 IN

## 2018-07-30 DIAGNOSIS — R07.89 OTHER CHEST PAIN: ICD-10-CM

## 2018-07-30 DIAGNOSIS — I10 ESSENTIAL HYPERTENSION: Primary | ICD-10-CM

## 2018-07-30 PROCEDURE — 99214 OFFICE O/P EST MOD 30 MIN: CPT | Performed by: INTERNAL MEDICINE

## 2018-07-30 NOTE — PATIENT INSTRUCTIONS
Low-Sodium Diet   AMBULATORY CARE:   A low-sodium diet  limits foods that are high in sodium (salt)  You will need to follow a low-sodium diet if you have high blood pressure, kidney disease, or heart failure  You may also need to follow this diet if you have a condition that is causing your body to retain (hold) extra fluid  You may need to limit the amount of sodium you eat to 1,500 mg  Ask your healthcare provider how much sodium you can have each day  How to use food labels to choose foods that are low in sodium:  Read food labels to find the amount of sodium they contain  The amount of sodium is listed in milligrams (mg)  The % Daily Value (DV) column tells you how much of your daily needs are met by 1 serving of the food for each nutrient listed  Choose foods that have less than 5% of the DV of sodium  These foods are considered low in sodium  Foods that have 20% or more of the DV of sodium are considered high in sodium  Some food labels may also list any of the following terms that tell you about the sodium content in the food:  · Sodium-free:  Less than 5 mg in each serving    · Very low sodium:  35 mg of sodium or less in each serving    · Low sodium:  140 mg of sodium or less in each serving    · Reduced sodium: At least 25% less sodium in each serving than the regular type    · Light in sodium:  50% less sodium in each serving    · Unsalted or no added salt:  No extra salt is added during processing (the food may still contain sodium)  Foods to avoid:  Salty foods are high in sodium   You should avoid the following:  · Processed foods:      ¨ Mixes for cornbread, biscuits, cake, and pudding     ¨ Instant foods, such as potatoes, cereals, noodles, and rice     ¨ Packaged foods, such as bread stuffing, rice and pasta mixes, snack dip mixes, and macaroni and cheese     ¨ Canned foods, such as canned vegetables, soups, broths, sauces, and vegetable or tomato juice    ¨ Snack foods, such as salted chips, popcorn, pretzels, pork rinds, salted crackers, and salted nuts    ¨ Frozen foods, such as dinners, entrees, vegetables with sauces, and breaded meats    ¨ Sauerkraut, pickled vegetables, and other foods prepared in brine    · Meats and cheeses:      ¨ Smoked or cured meat, such as corned beef, heller, ham, hot dogs, and sausage    ¨ Canned meats or spreads, such as potted meats, sardines, anchovies, and imitation seafood    ¨ Deli or lunch meats, such as bologna, ham, turkey, and roast beef    ¨ Processed cheese, such as American cheese and cheese spreads    · Condiments, sauces, and seasonings:      ¨ Salt (¼ teaspoon of salt contains 575 mg of sodium)    ¨ Seasonings made with salt, such as garlic salt, celery salt, onion salt, and seasoned salt    ¨ Regular soy sauce, barbecue sauce, teriyaki sauce, steak sauce, Worcestershire sauce, and most flavored vinegars    ¨ Canned gravy and mixes     ¨ Regular condiments, such as mustard, ketchup, and salad dressings    ¨ Pickles and olives    ¨ Meat tenderizers and monosodium glutamate (MSG)  Foods to include:  Read the food label to find the exact amount of sodium in each serving  · Bread and cereal:  Try to choose breads with less than 80 mg of sodium per serving  ¨ Bread, roll, cynthia, tortilla, or unsalted crackers  ¨ Ready-to-eat cereals with less than 5% DV of sodium (examples include shredded wheat and puffed rice)    ¨ Pasta    · Vegetables and fruits:      ¨ Unsalted fresh, frozen, or canned vegetables    ¨ Fresh, frozen, or canned fruits    ¨ Fruit juice    · Dairy:  One serving has about 150 mg of sodium  ¨ Milk, all types    ¨ Yogurt    ¨ Hard cheese, such as cheddar, Swiss, [employer], or mozzarella    · Meat and other protein foods:  Some raw meats may have added sodium       ¨ Plain meats, fish, and poultry     ¨ Eggs    · Other foods:      ¨ Homemade pudding    ¨ Unsalted nuts, popcorn, or pretzels    ¨ Unsalted butter or margarine  Ways to decrease sodium:   · Add spices and herbs to foods instead of salt during cooking  Use salt-free seasonings to add flavor to foods  Examples include onion powder, garlic powder, basil, marrero powder, paprika, and parsley  Try lemon or lime juice or vinegar to give foods a tart flavor  Use hot peppers, pepper, or cayenne pepper to add a spicy flavor to foods  · Do not keep a salt shaker at your kitchen table  This may help keep you from adding salt to food at the table  It may take time to get used to enjoying the natural flavor of food instead of adding salt  Talk to your healthcare provider before you use salt substitutes  Some salt substitutes have a high amount of potassium and need to be avoided if you have kidney disease  · Choose low-sodium foods at restaurants  Meals from restaurants are often high in sodium  Some restaurants have nutrition information on the menu that tells you the amount of sodium in their foods  If possible, ask for your food to be prepared with less, or no salt  · Shop for unsalted or low-sodium foods and snacks at the grocery store  Examples include unsalted or low-sodium broths, soups, and canned vegetables  Choose fresh or frozen vegetables instead  Choose unsalted nuts or seeds or fresh fruits or vegetables as snacks  Read food labels and choose salt-free, very low-sodium, or low-sodium foods  © 2017 [employer] Information is for End User's use only and may not be sold, redistributed or otherwise used for commercial purposes  All illustrations and images included in CareNotes® are the copyrighted property of [employer]  or Jc Malcolm  The above information is an  only  It is not intended as medical advice for individual conditions or treatments  Talk to your doctor, nurse or pharmacist before following any medical regimen to see if it is safe and effective for you

## 2018-07-30 NOTE — LETTER
July 30, 2018     Mechelle Noonan DO  [address]  [facility] [address]    Patient: Tommie Wagner   YOB: 1970   Date of Visit: 7/30/2018       Dear Dr Hyatt Shows: Thank you for referring Tommie Wagner to me for evaluation  Below are my notes for this consultation  If you have questions, please do not hesitate to call me  I look forward to following your patient along with you           Sincerely,        Hyacinth Reeder MD        CC: No Recipients

## 2018-07-30 NOTE — PROGRESS NOTES
Cardiology Consultation     Myke Sparks  [id number]  1970  [address] [facility] [place]  [address] [facility] [address]    1  Essential hypertension     2  Other chest pain       Chief Complaint   Patient presents with    Follow-up     4 wk w stress and ECHO  Patient has no cardiac complaints at this time  HPI: Patient is here for evaluation and management of chest pain  He was noted to have an abnormal finding on his EKG from his PCPs office compared with prior EKG in 2017  He does note new onset of chest pain  He has been battling bronchitis  He does not have any shortness of breath, diaphoresis, lightheadedness, edema, orthopnea, or PND  No further complaints since last visit  Patient Active Problem List   Diagnosis    Acid reflux    Depression with anxiety    Elevated glucose    Elevated liver enzymes    Enlarged liver    Fatty liver    Obesity    KRISTEN on CPAP    Essential hypertension    Other chest pain     Past Medical History:   Diagnosis Date    GERD (gastroesophageal reflux disease)      Social History     Social History    Marital status: /Civil Union     Spouse name: N/A    Number of children: N/A    Years of education: N/A     Occupational History    Not on file       Social History Main Topics    Smoking status: Former Smoker    Smokeless tobacco: Never Used    Alcohol use Yes      Comment: socially    Drug use: No    Sexual activity: Not on file     Other Topics Concern    Not on file     Social History Narrative    No narrative on file      Family History   Problem Relation Age of Onset    Hypertension Mother     Hyperlipidemia Mother     Cancer Mother     Hypertension Father     Hyperlipidemia Father    Father and GF with stents/MI; HTN/HLD with both parents  Past Surgical History:   Procedure Laterality Date    SKIN CANCER EXCISION         Current Outpatient Prescriptions:     omeprazole (PriLOSEC) 10 mg delayed release capsule, Take 10 mg by mouth daily, Disp: , Rfl:   Allergies   Allergen Reactions    Ciprofloxacin Hives     Vitals:    07/30/18 1435   BP: 126/74   BP Location: Right arm   Patient Position: Sitting   Cuff Size: Large   Pulse: 80   SpO2: 98%   Weight: (!) 152 kg (335 lb 9 6 oz)   Height: 6' 2" (1 88 m)       Labs:  Hospital Outpatient Visit on 07/11/2018   Component Date Value    Protocol Name 07/11/2018 REBECCA     Time In Exercise Phase 07/11/2018 00:10:30     MAX  SYSTOLIC BP 12/23/5684 028     Max Diastolic Bp 21/57/3440 82     Max Heart Rate 07/11/2018 160     Max Predicted Heart Rate 07/11/2018 171     Reason for Termination 07/11/2018 Dyspnea     Test Indication 07/11/2018                      Value:Abnormal ECG  Chest Discomfort      Target Hr Formular 07/11/2018 (220 - Age)*100%     Chest Pain Statement 07/11/2018 none      No results found for: CHOL, TRIG, HDL, LDLDIRECT  Imaging: Xr Chest Pa & Lateral    Result Date: 5/23/2018  Narrative: CHEST INDICATION:   R04 2: Hemoptysis  COMPARISON:  9/8/2017 EXAM PERFORMED/VIEWS:  XR CHEST PA & LATERAL FINDINGS: Cardiomediastinal silhouette appears unremarkable  The lungs are clear  No pneumothorax or pleural effusion  Osseous structures appear within normal limits for patient age  Impression: No acute cardiopulmonary disease  Workstation performed: [id number]     Xr Ribs 2 Vw Left    Result Date: 5/23/2018  Narrative: LEFT RIBS INDICATION:   R07 9: Chest pain, unspecified  COMPARISON:  5/23/2018  VIEWS:  XR RIBS 2 VW LEFT FINDINGS: There is no fracture or pathologic bone lesion  Soft tissues are unremarkable  The visualized left hemithorax is unremarkable  There is no pneumothorax  Impression: No fracture  Workstation performed: [id number]       Review of Systems:  Review of Systems   Constitutional: Negative for activity change, appetite change, fatigue and fever     HENT: Negative for nosebleeds and sore throat  Eyes: Negative for photophobia and visual disturbance  Respiratory: Positive for cough and chest tightness  Negative for shortness of breath and wheezing  Cardiovascular: Negative for chest pain, palpitations and leg swelling  Gastrointestinal: Negative for abdominal pain, diarrhea, nausea and vomiting  Endocrine: Negative for polyuria  Genitourinary: Negative for dysuria, frequency and hematuria  Musculoskeletal: Negative for arthralgias, back pain and gait problem  Skin: Negative for pallor and rash  Neurological: Negative for dizziness, syncope, speech difficulty and light-headedness  Hematological: Does not bruise/bleed easily  Psychiatric/Behavioral: Negative for agitation, behavioral problems and confusion  Physical Exam:  Physical Exam   Constitutional: He is oriented to person, place, and time  He appears well-developed and well-nourished  No distress  HENT:   Head: Normocephalic and atraumatic  Nose: Nose normal    Eyes: EOM are normal  Pupils are equal, round, and reactive to light  No scleral icterus  Neck: Normal range of motion  Neck supple  No JVD present  Cardiovascular: Normal rate, regular rhythm, S1 normal and S2 normal   Exam reveals no gallop, no S3, no distant heart sounds and no friction rub  No murmur heard  No systolic murmur is present   Pulmonary/Chest: Effort normal and breath sounds normal  No respiratory distress  He has no wheezes  He has no rales  Abdominal: Soft  Bowel sounds are normal  He exhibits no distension  Musculoskeletal: He exhibits no edema or deformity  Neurological: He is alert and oriented to person, place, and time  No cranial nerve deficit  Skin: Skin is warm and dry  He is not diaphoretic  No erythema  Psychiatric: He has a normal mood and affect  His behavior is normal    Vitals reviewed      EKG:  Normal sinus rhythm  Normal ECG    Discussion/Summary:  Chest pain: repeated EKG today, which was normal   I'm unclear as to what the abnormality on EKG done yesterday was since I am unable to view that EKG  With risk factors for CAD and symptoms - we screened with treadmill stress testing - which was normal   We also checked an echo to evaluate for structural heart disease, which was also normal      Uncontrolled HTN:  Perhaps related to steroid use  Today's value is improved - continue to monitor

## 2018-09-19 ENCOUNTER — TELEPHONE (OUTPATIENT)
Dept: GASTROENTEROLOGY | Facility: CLINIC | Age: 48
End: 2018-09-19

## 2018-09-20 PROBLEM — K21.9 GASTROESOPHAGEAL REFLUX DISEASE: Status: ACTIVE | Noted: 2018-09-20

## 2018-10-01 ENCOUNTER — APPOINTMENT (OUTPATIENT)
Dept: RADIOLOGY | Facility: CLINIC | Age: 48
End: 2018-10-01
Payer: COMMERCIAL

## 2018-10-01 ENCOUNTER — TRANSCRIBE ORDERS (OUTPATIENT)
Dept: ADMINISTRATIVE | Facility: HOSPITAL | Age: 48
End: 2018-10-01

## 2018-10-01 DIAGNOSIS — G89.29 CHRONIC HEEL PAIN, LEFT: ICD-10-CM

## 2018-10-01 DIAGNOSIS — G89.29 CHRONIC HEEL PAIN, LEFT: Primary | ICD-10-CM

## 2018-10-01 DIAGNOSIS — M79.672 CHRONIC HEEL PAIN, LEFT: Primary | ICD-10-CM

## 2018-10-01 DIAGNOSIS — M79.672 CHRONIC HEEL PAIN, LEFT: ICD-10-CM

## 2018-10-01 PROCEDURE — 73650 X-RAY EXAM OF HEEL: CPT

## 2018-10-13 ENCOUNTER — HOSPITAL ENCOUNTER (OUTPATIENT)
Facility: HOSPITAL | Age: 48
Setting detail: OUTPATIENT SURGERY
Discharge: HOME | End: 2018-10-13
Attending: INTERNAL MEDICINE | Admitting: INTERNAL MEDICINE
Payer: COMMERCIAL

## 2018-10-13 ENCOUNTER — ANESTHESIA EVENT (OUTPATIENT)
Dept: PERIOP | Facility: HOSPITAL | Age: 48
End: 2018-10-13
Payer: COMMERCIAL

## 2018-10-13 ENCOUNTER — ANESTHESIA (OUTPATIENT)
Dept: PERIOP | Facility: HOSPITAL | Age: 48
End: 2018-10-13
Payer: COMMERCIAL

## 2018-10-13 VITALS
TEMPERATURE: 98.2 F | DIASTOLIC BLOOD PRESSURE: 92 MMHG | OXYGEN SATURATION: 94 % | SYSTOLIC BLOOD PRESSURE: 139 MMHG | BODY MASS INDEX: 40.43 KG/M2 | HEIGHT: 74 IN | RESPIRATION RATE: 20 BRPM | WEIGHT: 315 LBS | HEART RATE: 63 BPM

## 2018-10-13 DIAGNOSIS — K21.9 GASTROESOPHAGEAL REFLUX DISEASE, ESOPHAGITIS PRESENCE NOT SPECIFIED: ICD-10-CM

## 2018-10-13 DIAGNOSIS — K21.9 GASTROESOPHAGEAL REFLUX DISEASE WITHOUT ESOPHAGITIS: Primary | ICD-10-CM

## 2018-10-13 PROCEDURE — 88305 TISSUE EXAM BY PATHOLOGIST: CPT | Performed by: PATHOLOGY

## 2018-10-13 PROCEDURE — 88342 IMHCHEM/IMCYTCHM 1ST ANTB: CPT | Performed by: PATHOLOGY

## 2018-10-13 PROCEDURE — 43239 EGD BIOPSY SINGLE/MULTIPLE: CPT | Performed by: INTERNAL MEDICINE

## 2018-10-13 RX ORDER — PANTOPRAZOLE SODIUM 40 MG/1
40 TABLET, DELAYED RELEASE ORAL DAILY
Qty: 30 TABLET | Refills: 3 | Status: SHIPPED | OUTPATIENT
Start: 2018-10-13 | End: 2019-02-12 | Stop reason: SDUPTHER

## 2018-10-13 RX ORDER — LIDOCAINE HYDROCHLORIDE 10 MG/ML
INJECTION, SOLUTION EPIDURAL; INFILTRATION; INTRACAUDAL; PERINEURAL AS NEEDED
Status: DISCONTINUED | OUTPATIENT
Start: 2018-10-13 | End: 2018-10-13 | Stop reason: SURG

## 2018-10-13 RX ORDER — SODIUM CHLORIDE, SODIUM LACTATE, POTASSIUM CHLORIDE, CALCIUM CHLORIDE 600; 310; 30; 20 MG/100ML; MG/100ML; MG/100ML; MG/100ML
125 INJECTION, SOLUTION INTRAVENOUS CONTINUOUS
Status: DISCONTINUED | OUTPATIENT
Start: 2018-10-13 | End: 2018-10-13 | Stop reason: HOSPADM

## 2018-10-13 RX ORDER — PROPOFOL 10 MG/ML
INJECTION, EMULSION INTRAVENOUS AS NEEDED
Status: DISCONTINUED | OUTPATIENT
Start: 2018-10-13 | End: 2018-10-13 | Stop reason: SURG

## 2018-10-13 RX ORDER — IBUPROFEN 200 MG
TABLET ORAL EVERY 6 HOURS PRN
COMMUNITY
End: 2020-05-13

## 2018-10-13 RX ADMIN — LIDOCAINE HYDROCHLORIDE 20 MG: 10 INJECTION, SOLUTION EPIDURAL; INFILTRATION; INTRACAUDAL; PERINEURAL at 09:12

## 2018-10-13 RX ADMIN — PROPOFOL 100 MG: 10 INJECTION, EMULSION INTRAVENOUS at 09:13

## 2018-10-13 RX ADMIN — PROPOFOL 100 MG: 10 INJECTION, EMULSION INTRAVENOUS at 09:12

## 2018-10-13 RX ADMIN — PROPOFOL 50 MG: 10 INJECTION, EMULSION INTRAVENOUS at 09:14

## 2018-10-13 RX ADMIN — PROPOFOL 30 MG: 10 INJECTION, EMULSION INTRAVENOUS at 09:15

## 2018-10-13 RX ADMIN — SODIUM CHLORIDE, SODIUM LACTATE, POTASSIUM CHLORIDE, AND CALCIUM CHLORIDE: .6; .31; .03; .02 INJECTION, SOLUTION INTRAVENOUS at 08:39

## 2018-10-13 NOTE — DISCHARGE INSTRUCTIONS
Upper Endoscopy   WHAT YOU NEED TO KNOW:   An upper endoscopy is also called an upper gastrointestinal (GI) endoscopy, or an esophagogastroduodenoscopy (EGD)  You may feel bloated, gassy, or have some abdominal discomfort after your procedure  Your throat may be sore for 24 to 36 hours  You may burp or pass gas from air that is still inside your body  DISCHARGE INSTRUCTIONS:   Call 911 if:   · You have sudden chest pain or trouble breathing  Seek care immediately if:   · You feel dizzy or faint  · You have trouble swallowing  · You have severe throat pain  · Your bowel movements are very dark or black  · Your abdomen is hard and firm and you have severe pain  · You vomit blood  Contact your healthcare provider if:   · You feel full or bloated and cannot burp or pass gas  · You have not had a bowel movement for 3 days after your procedure  · You have neck pain  · You have a fever or chills  · You have nausea or are vomiting  · You have a rash or hives  · You have questions or concerns about your endoscopy  Relieve a sore throat:  Suck on throat lozenges or crushed ice  Gargle with a small amount of warm salt water  Mix 1 teaspoon of salt and 1 cup of warm water to make salt water  Relieve gas and discomfort from bloating:  Lie on your right side with a heating pad on your abdomen  Take short walks to help pass gas  Eat small meals until bloating is relieved  Rest after your procedure:  Do not drive or make important decisions until the day after your procedure  Return to your normal activity as directed  You can usually return to work the day after your procedure  Follow up with your healthcare provider as directed:  Write down your questions so you remember to ask them during your visits  © 2017 [employer]  Information is for End User's use only and may not be sold, redistributed or otherwise used for commercial purposes   All illustrations and images included in Sarita are the copyrighted property of [employer]  or Jc Malcolm  The above information is an  only  It is not intended as medical advice for individual conditions or treatments  Talk to your doctor, nurse or pharmacist before following any medical regimen to see if it is safe and effective for you

## 2018-10-13 NOTE — DISCHARGE INSTR - AVS FIRST PAGE
ESOPHAGOGASTRODUODENOSCOPY    PROCEDURE: EGD/ Biopsy    INDICATIONS: GERD    POST-OP DIAGNOSIS: See the impression below    SEDATION: Monitored anesthesia care, check anesthesia records    PHYSICAL EXAM:  Vitals:    10/13/18 0832   BP: 169/97   Pulse: 70   Resp: 19   Temp: 98 2 °F (36 8 °C)   SpO2: 96%     Body mass index is 43 14 kg/m²  General: NAD  Heart: S1 & S2 normal, RRR  Lungs: CTA, No rales or rhonchi  Abdomen: Soft, nontender, nondistended, good bowel sounds    CONSENT:  Informed consent was obtained for the procedure, including sedation after explaining the risks and benefits of the procedure  Risks including but not limited to bleeding, perforation, infection, aspiration were discussed in detail  Also explained about less than 100% sensitivity with the exam and other alternatives  PREPARATION:   EKG tracing, pulse oximetry, blood pressure were monitored throughout the procedure  Patient was identified by myself both verbally and by visual inspection of ID band  DESCRIPTION:   Patient was placed in the left lateral decubitus position and was sedated with the above medication  The gastroscope was introduced in to the oropharynx and the esophagus was intubated under direct visualization  Scope was passed down the esophagus up to 2nd part of the duodenum  A careful inspection was made as the gastroscope was withdrawn, including a retroflexed view of the stomach; findings and interventions are described below  FINDINGS:    #1  Esophagus and GEJ- Two 1 cm projections of Jack's mucosa were noted at the GE junction  Multiple biopsies were obtained  A small 1 cm hiatal hernia was noted    #2  Stomach- the cardia fundus body and antrum were normal   Multiple biopsies were obtained  #3  Duodenum- the bulb and 2nd portion were normal   Multiple biopsies were obtained           IMPRESSIONS:    Jack's esophagus short-segment  1 cm hiatal hernia    RECOMMENDATIONS:   Will Protonix 40 mg daily for 1 year  EGD in 1 year  Reflux precautions  Weight loss  Await pathology    COMPLICATIONS:  None; patient tolerated the procedure well    DISPOSITION: PACU  CONDITION: Stable    Darlin Bernard MD  10/13/2018,9:18 AM

## 2018-10-13 NOTE — OP NOTE
ESOPHAGOGASTRODUODENOSCOPY    PROCEDURE: EGD/ Biopsy    INDICATIONS: GERD    POST-OP DIAGNOSIS: See the impression below    SEDATION: Monitored anesthesia care, check anesthesia records    PHYSICAL EXAM:  Vitals:    10/13/18 0832   BP: 169/97   Pulse: 70   Resp: 19   Temp: 98 2 °F (36 8 °C)   SpO2: 96%     Body mass index is 43 14 kg/m²  General: NAD  Heart: S1 & S2 normal, RRR  Lungs: CTA, No rales or rhonchi  Abdomen: Soft, nontender, nondistended, good bowel sounds    CONSENT:  Informed consent was obtained for the procedure, including sedation after explaining the risks and benefits of the procedure  Risks including but not limited to bleeding, perforation, infection, aspiration were discussed in detail  Also explained about less than 100% sensitivity with the exam and other alternatives  PREPARATION:   EKG tracing, pulse oximetry, blood pressure were monitored throughout the procedure  Patient was identified by myself both verbally and by visual inspection of ID band  DESCRIPTION:   Patient was placed in the left lateral decubitus position and was sedated with the above medication  The gastroscope was introduced in to the oropharynx and the esophagus was intubated under direct visualization  Scope was passed down the esophagus up to 2nd part of the duodenum  A careful inspection was made as the gastroscope was withdrawn, including a retroflexed view of the stomach; findings and interventions are described below  FINDINGS:    #1  Esophagus and GEJ- Two 1 cm projections of Jack's mucosa were noted at the GE junction  Multiple biopsies were obtained  A small 1 cm hiatal hernia was noted    #2  Stomach- the cardia fundus body and antrum were normal   Multiple biopsies were obtained  #3  Duodenum- the bulb and 2nd portion were normal   Multiple biopsies were obtained           IMPRESSIONS:    Jack's esophagus short-segment  1 cm hiatal hernia    RECOMMENDATIONS:   Will Protonix 40 mg daily for 1 year  EGD in 1 year  Reflux precautions  Weight loss  Await pathology    COMPLICATIONS:  None; patient tolerated the procedure well    DISPOSITION: PACU  CONDITION: Stable    Shawn Berumen MD  10/13/2018,9:18 AM

## 2018-10-13 NOTE — ANESTHESIA POSTPROCEDURE EVALUATION
Post-Op Assessment Note      CV Status:  Stable    Mental Status:  Lethargic    Hydration Status:  Stable    PONV Controlled:  None    Airway Patency:  Patent and adequate    Post Op Vitals Reviewed: Yes          Staff: CRNA           BP   142/90   Temp      Pulse  91   Resp   18   SpO2   94

## 2018-10-13 NOTE — ANESTHESIA PREPROCEDURE EVALUATION
Review of Systems/Medical History  Patient summary reviewed  Chart reviewed      Cardiovascular  Hypertension controlled,    Pulmonary  Sleep apnea CPAP,        GI/Hepatic    GERD poorly controlled, Liver disease ,             Endo/Other     GYN       Hematology   Musculoskeletal       Neurology   Psychology           Physical Exam    Airway    Mallampati score: II  TM Distance: >3 FB  Neck ROM: full     Dental   No notable dental hx     Cardiovascular  Rhythm: regular, Rate: normal, Cardiovascular exam normal    Pulmonary  Pulmonary exam normal Breath sounds clear to auscultation,     Other Findings        Anesthesia Plan  ASA Score- 2     Anesthesia Type- IV sedation with anesthesia with ASA Monitors  Additional Monitors:   Airway Plan:         Plan Factors-    Induction- intravenous  Postoperative Plan- Plan for postoperative opioid use  Informed Consent- Anesthetic plan and risks discussed with patient and spouse  I personally reviewed this patient with the CRNA  Discussed and agreed on the Anesthesia Plan with the CRNA  Rafa Heredia

## 2018-11-01 ENCOUNTER — OFFICE VISIT (OUTPATIENT)
Dept: URGENT CARE | Facility: CLINIC | Age: 48
End: 2018-11-01
Payer: COMMERCIAL

## 2018-11-01 VITALS
HEIGHT: 74 IN | SYSTOLIC BLOOD PRESSURE: 148 MMHG | HEART RATE: 84 BPM | RESPIRATION RATE: 18 BRPM | BODY MASS INDEX: 40.43 KG/M2 | WEIGHT: 315 LBS | OXYGEN SATURATION: 96 % | DIASTOLIC BLOOD PRESSURE: 84 MMHG | TEMPERATURE: 97.4 F

## 2018-11-01 DIAGNOSIS — M54.2 NECK PAIN ON LEFT SIDE: Primary | ICD-10-CM

## 2018-11-01 PROCEDURE — 99213 OFFICE O/P EST LOW 20 MIN: CPT | Performed by: PHYSICIAN ASSISTANT

## 2018-11-01 RX ORDER — CYCLOBENZAPRINE HCL 10 MG
10 TABLET ORAL 3 TIMES DAILY PRN
Qty: 20 TABLET | Refills: 0 | Status: SHIPPED | OUTPATIENT
Start: 2018-11-01 | End: 2019-03-12

## 2018-11-01 RX ORDER — IBUPROFEN 600 MG/1
600 TABLET ORAL EVERY 6 HOURS PRN
Qty: 20 TABLET | Refills: 0 | Status: SHIPPED | OUTPATIENT
Start: 2018-11-01 | End: 2019-03-12

## 2018-11-01 NOTE — PROGRESS NOTES
[facility]        NAME: Cassie Matthews is a 51 y o  male  : 1970    MRN: [id number]  DATE: 2018  TIME: 3:15 PM    Assessment and Plan   Neck pain on left side [M54 2]  1  Neck pain on left side  cyclobenzaprine (FLEXERIL) 10 mg tablet    ibuprofen (MOTRIN) 600 mg tablet         Patient Instructions     1  Left sided neck pain- consistent with muscle spasm  -Apply warm, moist heat  -Gentle stretching  -Use Ibuprofen every 6-8 hours with food  -Use muscle relaxer as directed- do not drive or work while taking this  -Follow-up with PCP within 3-5 days if no improvement    Go to ER with worsening symptoms, worsening pain, fever, numbness/tingling, bowel/bladder incontinence, weakness, or any new concerns    Chief Complaint     Chief Complaint   Patient presents with    Neck Pain     splitting wood stained neck, hurts to move neck from side to side and then the pain goes down the neck into left arm x today         History of Present Illness       Patient is a 44-year-old male who presents today for evaluation of left-sided neck pain that started this morning upon waking  Patient states that he has been doing a lot of physical work and has been cutting and splitting wood  Patient states that this morning upon waking he started having left-sided neck pain and pain with any movement of his neck or his head  Patient states that he tried taking ibuprofen at home however as the day went on, the pain became worse  Patient states that at times he gets intense spasms in his neck that he rates as an 8/10  Patient also states the pain radiates down his back pain to his left arm  Patient denies any direct injury or trauma to his neck  Review of Systems   Review of Systems   Constitutional: Negative for chills and fever  Respiratory: Negative for shortness of breath  Cardiovascular: Negative for chest pain  Musculoskeletal: Positive for neck pain     Neurological: Negative for weakness and numbness  Current Medications       Current Outpatient Prescriptions:     ibuprofen (MOTRIN) 200 mg tablet, Take by mouth every 6 (six) hours as needed for mild pain, Disp: , Rfl:     pantoprazole (PROTONIX) 40 mg tablet, Take 1 tablet (40 mg total) by mouth daily, Disp: 30 tablet, Rfl: 3    cyclobenzaprine (FLEXERIL) 10 mg tablet, Take 1 tablet (10 mg total) by mouth 3 (three) times a day as needed for muscle spasms, Disp: 20 tablet, Rfl: 0    ibuprofen (MOTRIN) 600 mg tablet, Take 1 tablet (600 mg total) by mouth every 6 (six) hours as needed for mild pain, Disp: 20 tablet, Rfl: 0    Current Allergies     Allergies as of 11/01/2018 - Reviewed 11/01/2018   Allergen Reaction Noted    Ciprofloxacin Hives 09/08/2017            The following portions of the patient's history were reviewed and updated as appropriate: allergies, current medications, past family history, past medical history, past social history, past surgical history and problem list      Past Medical History:   Diagnosis Date    CPAP (continuous positive airway pressure) dependence     GERD (gastroesophageal reflux disease)     Sleep apnea        Past Surgical History:   Procedure Laterality Date    ABCESS DRAINAGE      DE ESOPHAGOGASTRODUODENOSCOPY TRANSORAL DIAGNOSTIC N/A 10/13/2018    Procedure: ESOPHAGOGASTRODUODENOSCOPY (EGD); Surgeon: Evin Lundberg MD;  Location: [facility]; Service: Gastroenterology    SKIN CANCER EXCISION         Family History   Problem Relation Age of Onset    Hypertension Mother     Hyperlipidemia Mother     Cancer Mother     Hypertension Father     Hyperlipidemia Father          Medications have been verified          Objective   /84 (BP Location: Left arm, Patient Position: Sitting, Cuff Size: Large)   Pulse 84   Temp (!) 97 4 °F (36 3 °C) (Tympanic)   Resp 18   Ht 6' 2" (1 88 m)   Wt (!) 154 kg (340 lb)   SpO2 96%   BMI 43 65 kg/m²        Physical Exam     Physical Exam   Constitutional: He is oriented to person, place, and time  He appears well-developed and well-nourished  No distress  Neck: Spinous process tenderness and muscular tenderness present  Decreased range of motion present  Cardiovascular: Normal rate, regular rhythm and normal heart sounds  Pulmonary/Chest: Effort normal and breath sounds normal  He has no wheezes  He has no rales  Neurological: He is alert and oriented to person, place, and time  He has normal strength  No sensory deficit  Skin: Skin is warm and dry  Psychiatric: He has a normal mood and affect  Nursing note and vitals reviewed

## 2018-11-01 NOTE — LETTER
November 1, 2018     Patient: Shayna Lechuga   YOB: 1970   Date of Visit: 11/1/2018       To Whom It May Concern: It is my medical opinion that Shayna Lechuga may return to work on 11/5/18  If you have any questions or concerns, please don't hesitate to call           Sincerely,        Franchester Prader, PA-C    CC: No Recipients

## 2018-11-01 NOTE — PATIENT INSTRUCTIONS
1  Left sided neck pain- consistent with muscle spasm  -Apply warm, moist heat  -Gentle stretching  -Use Ibuprofen every 6-8 hours with food  -Use muscle relaxer as directed- do not drive or work while taking this  -Follow-up with PCP within 3-5 days if no improvement    Go to ER with worsening symptoms, worsening pain, fever, numbness/tingling, bowel/bladder incontinence, weakness, or any new concerns    Acute Neck Pain   AMBULATORY CARE:   Acute neck pain  starts suddenly, increases quickly, and goes away in a few days  The pain may come and go, or be worse with certain movements  The pain may be only in your neck, or it may move to your arms, back, or shoulders  You may also have pain that starts in another body area and moves to your neck  Seek care immediately if:   · You have an injury that causes neck pain and shooting pain down your arms or legs  · Your neck pain suddenly becomes severe  · You have neck pain along with numbness, tingling, or weakness in your arms or legs  · You have a stiff neck, a headache, and a fever  Contact your healthcare provider if:   · You have new or worsening symptoms  · Your symptoms continue even after treatment  · You have questions or concerns about your condition or care  Treatment  may include any of the following, depending on what is causing your pain:  · Medicines  may be prescribed or recommended by your healthcare provider for pain  You may need medicine to treat nerve pain or to stop muscle spasms  Medicines may also be given to reduce inflammation  Your healthcare provider may inject medicine into a nerve to block pain  Over-the-counter NSAID medicine or acetaminophen may be recommended to help treat minor pain or inflammation  · Traction  is used to relieve pressure from nerves  Your head is gently pulled up and away from your neck  This stretches muscles and ligaments and makes more room for the spine   Your healthcare provider will tell you the kind of traction that will help your neck pain  Do not use traction devices at home unless directed by your healthcare provider  Manage or prevent acute neck pain:   · Rest your neck as directed  Do not make sudden movements, such as turning your head quickly  Your healthcare provider may recommend you wear a cervical collar for a short time  The collar will prevent you from moving your head  This will give your neck time to heal if an injury is causing your neck pain  Ask your healthcare provider when you can return to sports or other normal daily activities  · Apply heat as directed  Heat helps relieve pain and swelling  Use a heat wrap, or soak a small towel in warm water  Wring out the extra water  Apply the heat wrap or towel for 20 minutes every hour, or as directed  · Apply ice as directed  Ice helps relieve pain and swelling, and can help prevent tissue damage  Use an ice pack, or put ice in a bag  Cover the ice pack or back with a towel before you apply it to your neck  Apply the ice pack or ice for 15 minutes every hour, or as directed  Your healthcare provider can tell you how often to apply ice  · Do neck exercises as directed  Neck exercises help strengthen the muscles and increase range of motion  Your healthcare provider will tell you which exercises are right for you  He may give you instructions, or he may recommend that you work with a physical therapist  Your healthcare provider or therapist can make sure you are doing the exercises correctly  · Maintain good posture  Try to keep your head and shoulders lifted when you sit  If you work in front of a computer, make sure the monitor is at the right level  You should not need to look up down to see the screen  You should also not have to lean forward to be able to read what is on the screen  Make sure your keyboard, mouse, and other computer items are placed where you do not have to extend your shoulder to reach them   Get up often if you work in front of a computer or sit for long periods of time  Stretch or walk around to keep your neck muscles loose  Follow up with your healthcare provider as directed: Your healthcare provider may refer you to a specialist if your pain does not get better with treatment  Write down your questions so you remember to ask them during your visits  © 2017 [employer]  Information is for End User's use only and may not be sold, redistributed or otherwise used for commercial purposes  All illustrations and images included in CareNotes® are the copyrighted property of [employer]  or [employer]  The above information is an  only  It is not intended as medical advice for individual conditions or treatments  Talk to your doctor, nurse or pharmacist before following any medical regimen to see if it is safe and effective for you

## 2018-12-07 ENCOUNTER — TELEPHONE (OUTPATIENT)
Dept: GASTROENTEROLOGY | Facility: CLINIC | Age: 48
End: 2018-12-07

## 2018-12-07 NOTE — TELEPHONE ENCOUNTER
Recall states he was due for 8 week egd f/u wife called and stated that once you got path back you told them he doesn't have to come back for a year when he has the colon, please advise

## 2019-02-12 DIAGNOSIS — K21.9 GASTROESOPHAGEAL REFLUX DISEASE WITHOUT ESOPHAGITIS: ICD-10-CM

## 2019-02-12 RX ORDER — PANTOPRAZOLE SODIUM 40 MG/1
40 TABLET, DELAYED RELEASE ORAL DAILY
Qty: 30 TABLET | Refills: 3 | Status: SHIPPED | OUTPATIENT
Start: 2019-02-12 | End: 2019-08-30 | Stop reason: SDUPTHER

## 2019-03-12 ENCOUNTER — OFFICE VISIT (OUTPATIENT)
Dept: OBGYN CLINIC | Facility: MEDICAL CENTER | Age: 49
End: 2019-03-12
Payer: COMMERCIAL

## 2019-03-12 VITALS
HEIGHT: 74 IN | BODY MASS INDEX: 40.43 KG/M2 | WEIGHT: 315 LBS | DIASTOLIC BLOOD PRESSURE: 80 MMHG | SYSTOLIC BLOOD PRESSURE: 115 MMHG | HEART RATE: 74 BPM

## 2019-03-12 DIAGNOSIS — M25.512 CHRONIC LEFT SHOULDER PAIN: Primary | ICD-10-CM

## 2019-03-12 DIAGNOSIS — G89.29 CHRONIC LEFT SHOULDER PAIN: Primary | ICD-10-CM

## 2019-03-12 DIAGNOSIS — M75.22 BICEPS TENDONITIS, LEFT: ICD-10-CM

## 2019-03-12 PROCEDURE — 99203 OFFICE O/P NEW LOW 30 MIN: CPT | Performed by: EMERGENCY MEDICINE

## 2019-03-12 NOTE — PATIENT INSTRUCTIONS
Rotator Cuff Tendinitis   WHAT YOU NEED TO KNOW:   What is rotator cuff tendinitis? Rotator cuff tendinitis is inflammation of the tendons in your shoulder joint  A tendon is a cord of tough tissue that connects your muscles to your bones  The rotator cuff is made up of a group of muscles and tendons that hold the shoulder joint in place  What causes rotator cuff tendinitis? · Overuse: This happens from too much shoulder activity  Overuse commonly happens to athletes, such as baseball pitchers, swimmers, and tennis players  You may also develop this condition if you frequently have to work with your arms overhead  · Impingement: This injury happens when the arm bone moves up and traps the tendon  Falls, incorrect arm movements, and weak shoulder muscles may cause impingement  This may also happen if you overtrain for sports or have a sudden change in arm or shoulder activity  · Calcium deposits:  Calcium may deposit in the tendons and cause irritation and inflammation of the tendon  What are the signs and symptoms of rotator cuff tendinitis? You have pain and swelling in your shoulder, especially when you lift your arm over your head  The pain may be worse after you sleep on the affected shoulder  Over time, the pain can become worse and you may have pain even when you are resting  Your shoulder and arm may also be weak  How is rotator cuff tendinitis diagnosed? Your healthcare provider may test your shoulder by moving your arm in different directions and raising it over your head  · A joint x-ray  is a picture of the bones and tissues in your joints  You may be given contrast liquid to help the pictures show up better  Tell a healthcare provider if you have ever had an allergic reaction to contrast liquid  · MRI:  This scan uses powerful magnets and a computer to take pictures of your shoulder  An MRI may be used to look for tendon injuries or other problems   You may be given dye to help the pictures show up better  Tell the healthcare provider if you have ever had an allergic reaction to contrast dye  Do not enter the MRI room with any metal  Metal can cause serious injury  Tell the healthcare provider if you have any metal in or on your body  · Ultrasound: An ultrasound uses sound waves to show pictures on a monitor  An ultrasound of your shoulder may be done to look for damage to your tendons  How is rotator cuff tendinitis treated? · Medicines:      ¨ NSAIDs:  These medicines decrease swelling and pain  NSAIDs are available without a doctor's order  Ask your healthcare provider which medicine is right for you  Ask how much to take and when to take it  Take as directed  NSAIDs can cause stomach bleeding or kidney problems if not taken correctly  ¨ Steroids: This medicine may be injected into the rotator cuff area to decrease inflammation and pain  · Surgery: You may need surgery if the pain and tightening in your shoulder do not go away  This may also be done if pain worsens or is so severe that it affects your daily activities  During surgery, your healthcare provider may remove bone spurs and inflamed tissue around the shoulder  · Physical therapy:  A physical therapist can teach you exercises to help improve movement and strength, and to decrease pain  The exercises may help you move your shoulder normally again and strengthen your rotator cuff  You may also learn other exercises, such as stretching and strengthening of your shoulder muscles  You may learn changes to make to your daily activities that will help decrease stress on your tendons  How can I care for my rotator cuff tendinitis at home? · Rest:  Limit activity on your affected shoulder to decrease stress on the tendon  This may help prevent further damage, decrease pain, and promote healing  · Ice:  Ice helps decrease swelling and pain  Ice may also help prevent tissue damage   Use an ice pack, or put crushed ice in a plastic bag  Cover it with a towel and place it on your shoulder for 15 to 20 minutes every hour or as directed  · Shoulder position:  Keep your shoulder in the correct position so it will heal faster  This may be done by increasing the height of armrests while you work, drive, and sit  Try not to sleep on the side of your injured shoulder  If you are a woman, wear a sports bra so that the straps are closer to your neck  This may help decrease stress in the affected shoulder  What are the risks of rotator cuff tendinitis? You could get an infection or bleed more than expected with surgery  Even after treatment, the shoulder may not move the way it did before  Without treatment, rotator cuff tendinitis may cause further problems with your arms and shoulders  You may not be able to do your usual physical activities  When should I contact my healthcare provider? · You have a fever  · You have pain and swelling in your shoulder even after you take pain medicine  · Your skin is itchy, swollen, or has a rash  · Your symptoms are not getting better or are getting worse  · You have questions or concerns about your condition or care  When should I seek immediate care or call 911? · You have sudden shortness of breath or chest pain  · Any part of your arm is numb, tingly, cold, blue, or pale  CARE AGREEMENT:   You have the right to help plan your care  Learn about your health condition and how it may be treated  Discuss treatment options with your caregivers to decide what care you want to receive  You always have the right to refuse treatment  The above information is an  only  It is not intended as medical advice for individual conditions or treatments  Talk to your doctor, nurse or pharmacist before following any medical regimen to see if it is safe and effective for you    © 2017 [employer] Information is for End User's use only and may not be sold, redistributed or otherwise used for commercial purposes  All illustrations and images included in CareNotes® are the copyrighted property of [employer]  or [employer]

## 2019-03-12 NOTE — PROGRESS NOTES
Assessment/Plan:    Diagnoses and all orders for this visit:    Chronic left shoulder pain    Biceps tendonitis, left     Patient declines steroid injections or physical therapy at this time  I have recommended googling or Youtube search for rotator cuff strengthening exercises  We will have him take a round of over-the-counter Advil if no improvement to consider prednisone  Return in about 6 weeks (around 4/23/2019)  Chief Complaint:   Left shoulder pain    Subjective:   Patient ID: Hernesto Parsons is a 51 y o  male  New patient presents for chronic left shoulder pain anterior in nature worse with lifting and reaching and overhead activities  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  Does have a history left shoulder pain previously treated at an outside facility for which an MRI obtained several years ago reportedly showed a rotator cuff tear possibly 70% however patient decided to hold off on any surgery  MRI obtained this past January did not show any rotator cuff tears, but did show some mild osteoarthritis of glenohumeral and acromioclavicular joint  Review of Systems    The following portions of the patient's chart were reviewed and updated as appropriate: Allergie  Allergies   Allergen Reactions    Ciprofloxacin Hives   s   Allergen Reactions    Ciprofloxacin Hives      Diagnosis Date    CPAP (continuous positive airway pressure) dependence     GERD (gastroesophageal reflux disease)     Sleep apnea        Past Surgical History:   Procedure Laterality Date    ABCESS DRAINAGE      CA ESOPHAGOGASTRODUODENOSCOPY TRANSORAL DIAGNOSTIC N/A 10/13/2018    Procedure: ESOPHAGOGASTRODUODENOSCOPY (EGD); Surgeon: Jj Little MD;  Location: [facility];   Service: Gastroenterology    SKIN CANCER EXCISION         Social History     Socioeconomic History    Marital status: /Civil Union     Spouse name: Not on file    Number of children: Not on file    Years of education: Not on file    Highest education level: Not on file   Occupational History    Not on file   Social Needs    Financial resource strain: Not on file    Food insecurity:     Worry: Not on file     Inability: Not on file    Transportation needs:     Medical: Not on file     Non-medical: Not on file   Tobacco Use    Smoking status: Former Smoker     Last attempt to quit: 2013     Years since quittin 1    Smokeless tobacco: Never Used   Substance and Sexual Activity    Alcohol use: Yes     Comment: 3-4 DRINKS EVERYDAY     Drug use: No    Sexual activity: Not on file   Lifestyle    Physical activity:     Days per week: Not on file     Minutes per session: Not on file    Stress: Not on file   Relationships    Social connections:     Talks on phone: Not on file     Gets together: Not on file     Attends [employer] service: Not on file     Active member of club or organization: Not on file     Attends meetings of clubs or organizations: Not on file     Relationship status: Not on file    Intimate partner violence:     Fear of current or ex partner: Not on file     Emotionally abused: Not on file     Physically abused: Not on file     Forced sexual activity: Not on file   Other Topics Concern    Not on file   Social History Narrative    Not on file       Family History   Problem Relation Age of Onset    Hypertension Mother     Hyperlipidemia Mother     Cancer Mother     Hypertension Father     Hyperlipidemia Father        Medications:    Current Outpatient Medications:     ibuprofen (MOTRIN) 200 mg tablet, Take by mouth every 6 (six) hours as needed for mild pain, Disp: , Rfl:     pantoprazole (PROTONIX) 40 mg tablet, Take 1 tablet (40 mg total) by mouth daily, Disp: 30 tablet, Rfl: 3    Patient Active Problem List   Diagnosis    Acid reflux    Depression with anxiety    Elevated glucose    Elevated liver enzymes    Enlarged liver    Fatty liver    Obesity    KRISTEN on CPAP    Essential hypertension    Other chest pain    Gastroesophageal reflux disease       Objective:  Right Shoulder Exam     Range of Motion   Active abduction: normal     Muscle Strength   External rotation: 4/5   Supraspinatus: 5/5       Left Shoulder Exam     Tenderness   The patient is experiencing tenderness in the biceps tendon  Range of Motion   Active abduction: normal   External rotation: normal   Internal rotation 0 degrees: abnormal     Muscle Strength   External rotation: 4/5   Supraspinatus: 4/5     Tests   Drop arm: negative    Comments:  Negative Yergason's  Positive speed's            Physical Exam      Neurologic Exam    Procedures    I have personally reviewed the written report of the pertinent studies     MRI left shoulder no rotator cuff tears mild OA glenohumeral and acromioclavicular joints

## 2019-03-26 ENCOUNTER — TELEPHONE (OUTPATIENT)
Dept: OBGYN CLINIC | Facility: HOSPITAL | Age: 49
End: 2019-03-26

## 2019-03-26 NOTE — TELEPHONE ENCOUNTER
[facility] stating we cannot give them a copy as we are not the ordering provider  They will have to call [facility] to obtain a copy of this

## 2019-03-26 NOTE — TELEPHONE ENCOUNTER
Caller: Patient   C/B # [phone]  Dr Lacey Arana    Patient's wife asked if we can send a copy of the MRI report that we received from [facility]  If possible can it be sent via email to [facility], if not please mail to the patient    Thanks

## 2019-06-13 PROCEDURE — 88305 TISSUE EXAM BY PATHOLOGIST: CPT | Performed by: PATHOLOGY

## 2019-08-27 DIAGNOSIS — K21.9 GASTROESOPHAGEAL REFLUX DISEASE WITHOUT ESOPHAGITIS: ICD-10-CM

## 2019-08-29 DIAGNOSIS — K21.9 GASTROESOPHAGEAL REFLUX DISEASE WITHOUT ESOPHAGITIS: ICD-10-CM

## 2019-08-30 RX ORDER — PANTOPRAZOLE SODIUM 40 MG/1
40 TABLET, DELAYED RELEASE ORAL DAILY
Qty: 30 TABLET | Refills: 3 | Status: SHIPPED | OUTPATIENT
Start: 2019-08-30 | End: 2020-03-03 | Stop reason: SDUPTHER

## 2019-09-02 RX ORDER — PANTOPRAZOLE SODIUM 40 MG/1
TABLET, DELAYED RELEASE ORAL
Qty: 30 TABLET | Refills: 3 | OUTPATIENT
Start: 2019-09-02

## 2019-09-03 PROCEDURE — 88305 TISSUE EXAM BY PATHOLOGIST: CPT | Performed by: PATHOLOGY

## 2019-10-17 ENCOUNTER — TELEPHONE (OUTPATIENT)
Dept: GASTROENTEROLOGY | Facility: CLINIC | Age: 49
End: 2019-10-17

## 2019-10-24 ENCOUNTER — TELEPHONE (OUTPATIENT)
Dept: GASTROENTEROLOGY | Facility: CLINIC | Age: 49
End: 2019-10-24

## 2020-01-09 ENCOUNTER — TELEPHONE (OUTPATIENT)
Dept: GASTROENTEROLOGY | Facility: CLINIC | Age: 50
End: 2020-01-09

## 2020-01-09 NOTE — TELEPHONE ENCOUNTER
Patient's wife called - Patient is due for yearly EGD  Patient also just turned 49 and needs to schedule routine colonoscopy   Please call Miguel Bhatia at [phone] ty

## 2020-01-28 PROCEDURE — 88305 TISSUE EXAM BY PATHOLOGIST: CPT | Performed by: STUDENT IN AN ORGANIZED HEALTH CARE EDUCATION/TRAINING PROGRAM

## 2020-03-03 DIAGNOSIS — K21.9 GASTROESOPHAGEAL REFLUX DISEASE WITHOUT ESOPHAGITIS: ICD-10-CM

## 2020-03-03 RX ORDER — PANTOPRAZOLE SODIUM 40 MG/1
40 TABLET, DELAYED RELEASE ORAL DAILY
Qty: 90 TABLET | Refills: 3 | Status: SHIPPED | OUTPATIENT
Start: 2020-03-03 | End: 2021-04-09

## 2020-03-10 PROCEDURE — 88305 TISSUE EXAM BY PATHOLOGIST: CPT | Performed by: STUDENT IN AN ORGANIZED HEALTH CARE EDUCATION/TRAINING PROGRAM

## 2020-03-13 RX ORDER — SODIUM CHLORIDE, SODIUM LACTATE, POTASSIUM CHLORIDE, CALCIUM CHLORIDE 600; 310; 30; 20 MG/100ML; MG/100ML; MG/100ML; MG/100ML
125 INJECTION, SOLUTION INTRAVENOUS CONTINUOUS
Status: CANCELLED | OUTPATIENT
Start: 2020-03-13

## 2020-03-14 ENCOUNTER — HOSPITAL ENCOUNTER (OUTPATIENT)
Dept: GASTROENTEROLOGY | Facility: HOSPITAL | Age: 50
Setting detail: OUTPATIENT SURGERY
Discharge: HOME | End: 2020-03-14
Attending: INTERNAL MEDICINE
Payer: COMMERCIAL

## 2020-03-14 ENCOUNTER — ANESTHESIA (OUTPATIENT)
Dept: GASTROENTEROLOGY | Facility: HOSPITAL | Age: 50
End: 2020-03-14

## 2020-03-14 ENCOUNTER — ANESTHESIA EVENT (OUTPATIENT)
Dept: GASTROENTEROLOGY | Facility: HOSPITAL | Age: 50
End: 2020-03-14

## 2020-03-14 ENCOUNTER — HOSPITAL ENCOUNTER (OUTPATIENT)
Dept: GASTROENTEROLOGY | Facility: HOSPITAL | Age: 50
Setting detail: OUTPATIENT SURGERY
Discharge: HOME | End: 2020-03-14
Attending: INTERNAL MEDICINE | Admitting: INTERNAL MEDICINE
Payer: COMMERCIAL

## 2020-03-14 VITALS
BODY MASS INDEX: 40.43 KG/M2 | TEMPERATURE: 98.2 F | HEIGHT: 74 IN | WEIGHT: 315 LBS | SYSTOLIC BLOOD PRESSURE: 143 MMHG | DIASTOLIC BLOOD PRESSURE: 87 MMHG | HEART RATE: 71 BPM | OXYGEN SATURATION: 95 % | RESPIRATION RATE: 20 BRPM

## 2020-03-14 DIAGNOSIS — Z12.11 SCREENING FOR COLON CANCER: ICD-10-CM

## 2020-03-14 DIAGNOSIS — K21.9 GASTROESOPHAGEAL REFLUX DISEASE WITHOUT ESOPHAGITIS: ICD-10-CM

## 2020-03-14 PROCEDURE — 88305 TISSUE EXAM BY PATHOLOGIST: CPT | Performed by: PATHOLOGY

## 2020-03-14 PROCEDURE — 45380 COLONOSCOPY AND BIOPSY: CPT | Performed by: INTERNAL MEDICINE

## 2020-03-14 PROCEDURE — 43239 EGD BIOPSY SINGLE/MULTIPLE: CPT | Performed by: INTERNAL MEDICINE

## 2020-03-14 PROCEDURE — NC001 PR NO CHARGE: Performed by: INTERNAL MEDICINE

## 2020-03-14 RX ORDER — SODIUM CHLORIDE, SODIUM LACTATE, POTASSIUM CHLORIDE, CALCIUM CHLORIDE 600; 310; 30; 20 MG/100ML; MG/100ML; MG/100ML; MG/100ML
125 INJECTION, SOLUTION INTRAVENOUS CONTINUOUS
Status: DISCONTINUED | OUTPATIENT
Start: 2020-03-14 | End: 2020-03-18 | Stop reason: HOSPADM

## 2020-03-14 RX ORDER — KETAMINE HYDROCHLORIDE 50 MG/ML
INJECTION, SOLUTION, CONCENTRATE INTRAMUSCULAR; INTRAVENOUS AS NEEDED
Status: DISCONTINUED | OUTPATIENT
Start: 2020-03-14 | End: 2020-03-14 | Stop reason: SURG

## 2020-03-14 RX ORDER — PROPOFOL 10 MG/ML
INJECTION, EMULSION INTRAVENOUS AS NEEDED
Status: DISCONTINUED | OUTPATIENT
Start: 2020-03-14 | End: 2020-03-14 | Stop reason: SURG

## 2020-03-14 RX ADMIN — KETAMINE HYDROCHLORIDE 30 MG: 50 INJECTION, SOLUTION INTRAMUSCULAR; INTRAVENOUS at 08:55

## 2020-03-14 RX ADMIN — SODIUM CHLORIDE, SODIUM LACTATE, POTASSIUM CHLORIDE, AND CALCIUM CHLORIDE 125 ML/HR: .6; .31; .03; .02 INJECTION, SOLUTION INTRAVENOUS at 08:30

## 2020-03-14 RX ADMIN — PROPOFOL 150 MG: 10 INJECTION, EMULSION INTRAVENOUS at 08:55

## 2020-03-14 RX ADMIN — PROPOFOL 20 MG: 10 INJECTION, EMULSION INTRAVENOUS at 09:09

## 2020-03-14 RX ADMIN — PROPOFOL 100 MG: 10 INJECTION, EMULSION INTRAVENOUS at 08:56

## 2020-03-14 RX ADMIN — PROPOFOL 20 MG: 10 INJECTION, EMULSION INTRAVENOUS at 09:05

## 2020-03-14 RX ADMIN — PROPOFOL 50 MG: 10 INJECTION, EMULSION INTRAVENOUS at 09:00

## 2020-03-14 NOTE — DISCHARGE INSTRUCTIONS
Colonoscopy   WHAT YOU NEED TO KNOW:   A colonoscopy is a procedure to examine the inside of your colon (intestine) with a scope  Polyps or tissue growths may have been removed during your colonoscopy  It is normal to feel bloated and to have some abdominal discomfort  You should be passing gas  If you have hemorrhoids or you had polyps removed, you may have a small amount of bleeding  DISCHARGE INSTRUCTIONS:   Seek care immediately if:   · You have a large amount of bright red blood in your bowel movements  · Your abdomen is hard and firm and you have severe pain  · You have sudden trouble breathing  Contact your healthcare provider if:   · You develop a rash or hives  · You have a fever within 24 hours of your procedure       · You have not had a bowel movement for 3 days after your procedure  · You have questions or concerns about your condition or care  Activity:   · Do not lift, strain, or run  for 3 days after your procedure  · Rest after your procedure  You have been given medicine to relax you  Do not  drive or make important decisions until the day after your procedure  Return to your normal activity as directed  · Relieve gas and discomfort from bloating  by lying on your right side with a heating pad on your abdomen  You may need to take short walks to help the gas move out  Eat small meals until bloating is relieved  If you had polyps removed: For 7 days after your procedure:  · Do not  take aspirin  · Do not  go on long car rides  Follow up with your healthcare provider as directed:  Write down your questions so you remember to ask them during your visits  © 2017 [employer] is for End User's use only and may not be sold, redistributed or otherwise used for commercial purposes  All illustrations and images included in CareNotes® are the copyrighted property of [employer]  or [employer]    The above information is an  only  It is not intended as medical advice for individual conditions or treatments  Talk to your doctor, nurse or pharmacist before following any medical regimen to see if it is safe and effective for you  Upper Endoscopy   WHAT YOU NEED TO KNOW:   An upper endoscopy is also called an upper gastrointestinal (GI) endoscopy, or an esophagogastroduodenoscopy (EGD)  You may feel bloated, gassy, or have some abdominal discomfort after your procedure  Your throat may be sore for 24 to 36 hours  You may burp or pass gas from air that is still inside your body  DISCHARGE INSTRUCTIONS:   Call 911 for any of the following:   · You have sudden chest pain or trouble breathing  Seek care immediately if:   · You feel dizzy or faint  · You have trouble swallowing  · Your bowel movements are very dark or black  · Your abdomen is hard and firm and you have severe pain  · You vomit blood  Contact your healthcare provider if:   · You feel full or bloated and cannot burp or pass gas  · You have not had a bowel movement for 3 days after your procedure  · You have neck pain  · You have a fever or chills  · You have nausea or are vomiting  · You have a rash or hives  · You have questions or concerns about your endoscopy  Relieve a sore throat:  Suck on throat lozenges or crushed ice  Gargle with a small amount of warm salt water  Mix 1 teaspoon of salt and 1 cup of warm water to make salt water  Relieve gas and discomfort from bloating:  Lie on your right side with a heating pad on your abdomen  Take short walks to help pass gas  Eat small meals until bloating is relieved  Rest after your procedure: You have been given medicine to relax you  Do not  drive or make important decisions until the day after your procedure  Return to your normal activity as directed  You can usually return to work the day after your procedure    Follow up with your healthcare provider as directed: Write down your questions so you remember to ask them during your visits  © 2017 [employer] is for End User's use only and may not be sold, redistributed or otherwise used for commercial purposes  All illustrations and images included in CareNotes® are the copyrighted property of [email or web address]  or Jc Malcolm  The above information is an  only  It is not intended as medical advice for individual conditions or treatments  Talk to your doctor, nurse or pharmacist before following any medical regimen to see if it is safe and effective for you

## 2020-03-14 NOTE — H&P
History and Physical - [facility]  Alexandra Lovell 47 y o  male MRN: [id number]                  HPI: Alexandra Lovell is a 47y o  year old male who presents for endoscopy colonoscopy for evaluation of gastroesophageal reflux disease with visualized Jack's with negative pathology in 2018 and colon cancer screening      REVIEW OF SYSTEMS: Per the HPI, and otherwise unremarkable  Historical Information   Past Medical History:   Diagnosis Date    Basal cell carcinoma     face and chest    CPAP (continuous positive airway pressure) dependence     GERD (gastroesophageal reflux disease)     Sleep apnea     uses Cpap     Past Surgical History:   Procedure Laterality Date    ABCESS DRAINAGE      ambreen rectal abcess    OR ESOPHAGOGASTRODUODENOSCOPY TRANSORAL DIAGNOSTIC N/A 10/13/2018    Procedure: ESOPHAGOGASTRODUODENOSCOPY (EGD); Surgeon: Alcira Fontaine MD;  Location: [facility];   Service: Gastroenterology    SKIN CANCER EXCISION       Social History   Social History     Substance and Sexual Activity   Alcohol Use Yes    Alcohol/week: 14 0 standard drinks    Types: 14 Standard drinks or equivalent per week    Comment: 3-4 DRINKS EVERYDAY -quit for Iris Alcaraz     Social History     Substance and Sexual Activity   Drug Use No     Social History     Tobacco Use   Smoking Status Former Smoker    Types: Cigarettes    Last attempt to quit: 2013    Years since quittin 2   Smokeless Tobacco Never Used   Tobacco Comment    couple on weekends     Family History   Problem Relation Age of Onset    Hypertension Mother     Hyperlipidemia Mother     Cancer Mother     Depression Mother     Anxiety disorder Mother     Skin cancer Mother     Hypertension Father     Hyperlipidemia Father     Coronary artery disease Father     Prostate cancer Paternal Grandfather     Prostate cancer Paternal Uncle        Meds/Allergies       (Not in a hospital admission)    Allergies   Allergen Reactions    Ciprofloxacin Hives       Objective     Blood pressure 136/82, pulse 64, temperature 98 5 °F (36 9 °C), temperature source Temporal, resp  rate 18, height 6' 2" (1 88 m), weight (!) 151 kg (332 lb 10 8 oz), SpO2 95 %  PHYSICAL EXAM    /82   Pulse 64   Temp 98 5 °F (36 9 °C) (Temporal)   Resp 18   Ht 6' 2" (1 88 m)   Wt (!) 151 kg (332 lb 10 8 oz)   SpO2 95%   BMI 42 71 kg/m²       Gen: NAD  CV: RRR  CHEST: Clear  ABD: soft, NT/ND  EXT: no edema      ASSESSMENT/PLAN:  This is a 47y o  year old male here for endoscopy colonoscopy, and he is stable and optimized for his procedure

## 2020-03-14 NOTE — ANESTHESIA PREPROCEDURE EVALUATION
Review of Systems/Medical History  Patient summary reviewed  Chart reviewed  No history of anesthetic complications     Cardiovascular  EKG reviewed, Hypertension ,    Pulmonary  Smoker ex-smoker  , Sleep apnea CPAP,        GI/Hepatic    GERD , Liver disease (fatty liver) ,             Endo/Other    Obesity    GYN       Hematology   Musculoskeletal       Neurology   Psychology   Anxiety, Depression ,              Physical Exam    Airway    Mallampati score: III  TM Distance: >3 FB  Neck ROM: full     Dental   No notable dental hx     Cardiovascular  Cardiovascular exam normal    Pulmonary  Pulmonary exam normal Breath sounds clear to auscultation,     Other Findings        Anesthesia Plan  ASA Score- 2     Anesthesia Type- IV sedation with anesthesia with ASA Monitors  Additional Monitors:   Airway Plan:         Plan Factors- Patient instructed to abstain from smoking on day of procedure       Induction- intravenous  Postoperative Plan-     Informed Consent- Anesthetic plan and risks discussed with patient  I personally reviewed this patient with the CRNA  Discussed and agreed on the Anesthesia Plan with the CRNA             Lab Results   Component Value Date    WBC 5 56 09/08/2017    HGB 14 8 09/08/2017    HCT 41 3 09/08/2017    MCV 97 09/08/2017     09/08/2017     Lab Results   Component Value Date    CALCIUM 9 0 09/08/2017    K 3 9 09/08/2017    CO2 26 09/08/2017     09/08/2017    BUN 14 09/08/2017    CREATININE 1 03 09/08/2017     Lab Results   Component Value Date    INR 0 98 09/08/2017    PROTIME 13 2 09/08/2017     Lab Results   Component Value Date    PTT 26 09/08/2017           Discussed with pt the benefits/alternatives and risks or General Anesthesia including breathing tube remaining in place if not strong enough, PONV, damage to lips and teeth, sore throat, eye injury or blindness    I, Dr Brian Cooley, the attending physician, have personally seen and evaluated the patient prior to anesthetic care  I have reviewed the pre-anesthetic record, and other medical records if appropriate to the anesthetic care  If a CRNA is involved in the case, I have reviewed the CRNA assessment, if present, and agree  The patient is in a suitable condition to proceed with my formulated anesthetic plan

## 2020-03-19 ENCOUNTER — TELEPHONE (OUTPATIENT)
Dept: GASTROENTEROLOGY | Facility: CLINIC | Age: 50
End: 2020-03-19

## 2020-03-19 NOTE — TELEPHONE ENCOUNTER
----- Message from Cy Martinez MD sent at 3/18/2020  6:17 PM EDT -----  pls tell him the polyp was precancerous and to have a colonoscopy in 5yrs; pls tell him the esophagus biopsy was negative

## 2020-05-13 DIAGNOSIS — M54.50 ACUTE BILATERAL LOW BACK PAIN WITHOUT SCIATICA: Primary | ICD-10-CM

## 2020-05-13 RX ORDER — IBUPROFEN 800 MG/1
800 TABLET ORAL EVERY 6 HOURS PRN
Qty: 90 TABLET | Refills: 0 | Status: SHIPPED | OUTPATIENT
Start: 2020-05-13 | End: 2020-08-25 | Stop reason: ALTCHOICE

## 2020-05-13 RX ORDER — CYCLOBENZAPRINE HCL 10 MG
10 TABLET ORAL 3 TIMES DAILY PRN
Qty: 15 TABLET | Refills: 0 | Status: SHIPPED | OUTPATIENT
Start: 2020-05-13 | End: 2020-08-25 | Stop reason: ALTCHOICE

## 2020-08-25 ENCOUNTER — OFFICE VISIT (OUTPATIENT)
Dept: FAMILY MEDICINE CLINIC | Facility: MEDICAL CENTER | Age: 50
End: 2020-08-25
Payer: COMMERCIAL

## 2020-08-25 VITALS
SYSTOLIC BLOOD PRESSURE: 150 MMHG | TEMPERATURE: 97.7 F | DIASTOLIC BLOOD PRESSURE: 100 MMHG | HEIGHT: 74 IN | OXYGEN SATURATION: 95 % | BODY MASS INDEX: 40.43 KG/M2 | WEIGHT: 315 LBS | HEART RATE: 80 BPM

## 2020-08-25 DIAGNOSIS — Z11.4 SCREENING FOR HIV (HUMAN IMMUNODEFICIENCY VIRUS): ICD-10-CM

## 2020-08-25 DIAGNOSIS — R73.9 HYPERGLYCEMIA: ICD-10-CM

## 2020-08-25 DIAGNOSIS — Z99.89 OSA ON CPAP: ICD-10-CM

## 2020-08-25 DIAGNOSIS — I10 ESSENTIAL HYPERTENSION: Primary | ICD-10-CM

## 2020-08-25 DIAGNOSIS — R74.01 TRANSAMINITIS: ICD-10-CM

## 2020-08-25 DIAGNOSIS — G47.33 OSA ON CPAP: ICD-10-CM

## 2020-08-25 DIAGNOSIS — E66.01 MORBID OBESITY (HCC): ICD-10-CM

## 2020-08-25 DIAGNOSIS — Z11.59 NEED FOR HEPATITIS C SCREENING TEST: ICD-10-CM

## 2020-08-25 PROBLEM — M75.110 INCOMPLETE TEAR OF ROTATOR CUFF: Status: RESOLVED | Noted: 2020-08-25 | Resolved: 2020-08-25

## 2020-08-25 PROBLEM — M75.110 INCOMPLETE TEAR OF ROTATOR CUFF: Status: ACTIVE | Noted: 2020-08-25

## 2020-08-25 PROBLEM — R07.89 OTHER CHEST PAIN: Status: RESOLVED | Noted: 2018-05-24 | Resolved: 2020-08-25

## 2020-08-25 PROBLEM — F41.8 DEPRESSION WITH ANXIETY: Status: RESOLVED | Noted: 2017-02-09 | Resolved: 2020-08-25

## 2020-08-25 PROCEDURE — 99214 OFFICE O/P EST MOD 30 MIN: CPT | Performed by: FAMILY MEDICINE

## 2020-08-25 PROCEDURE — 3008F BODY MASS INDEX DOCD: CPT | Performed by: FAMILY MEDICINE

## 2020-08-25 PROCEDURE — 3077F SYST BP >= 140 MM HG: CPT | Performed by: FAMILY MEDICINE

## 2020-08-25 PROCEDURE — 3080F DIAST BP >= 90 MM HG: CPT | Performed by: FAMILY MEDICINE

## 2020-08-25 PROCEDURE — 3725F SCREEN DEPRESSION PERFORMED: CPT | Performed by: FAMILY MEDICINE

## 2020-08-25 PROCEDURE — 1036F TOBACCO NON-USER: CPT | Performed by: FAMILY MEDICINE

## 2020-08-25 RX ORDER — AMLODIPINE BESYLATE 5 MG/1
5 TABLET ORAL DAILY
Qty: 30 TABLET | Refills: 1 | Status: SHIPPED | OUTPATIENT
Start: 2020-08-25 | End: 2020-11-03 | Stop reason: ALTCHOICE

## 2020-08-25 NOTE — PROGRESS NOTES
Assessment/Plan:       Diagnoses and all orders for this visit:    Essential hypertension  -     Comprehensive metabolic panel; Future  -     Lipid Panel with Direct LDL reflex; Future  -     T4, free; Future  -     TSH, 3rd generation; Future  -     Microalbumin / creatinine urine ratio  -     UA (URINE) with reflex to Scope  -     amLODIPine (NORVASC) 5 mg tablet; Take 1 tablet (5 mg total) by mouth daily  -     Discontinue: Misc  Devices MISC; Automatic blood pressure cuff to use daily as directed  -     Misc  Devices MISC; Automatic blood pressure cuff to use daily as directed  Blood pressure has been elevated on multiple occasions  I recommended starting medication  Patient was agreeable  I will have him start amlodipine 5 mg daily  I am going to prescribe him a blood pressure cuff as well  He is to check his blood pressure every day and call next week with results  Medication will be adjusted if needed  Check additional labs including urine testing  Hyperglycemia  -     Comprehensive metabolic panel; Future  -     Hemoglobin A1C; Future  Likely due to obesity, poor diet and lack of exercise  Check labs to assess for diabetes  Transaminitis  -     Comprehensive metabolic panel; Future  -     Gamma GT; Future  Likely due to obesity, poor diet and lack of exercise  Check labs to assess transaminase levels and for additional liver damage  KRISTEN on CPAP  -     Ambulatory referral to Sleep Medicine; Future  Due to obesity  Referral to Sleep Medicine for evaluation  Morbid obesity ([facility] [address] )  -     Ambulatory referral to Weight Management; Future  -     T4, free; Future  -     TSH, 3rd generation; Future  BMI Counseling: Body mass index is 44 78 kg/m²  The BMI is above normal  Nutrition recommendations include decreasing overall calorie intake  Exercise recommendations include moderate aerobic physical activity for 150 minutes/week   Patient referred to weight management due to patient being morbidly obese     Need for hepatitis C screening test  -     Hepatitis C antibody; Future  Screening for HIV (human immunodeficiency virus)  -     HIV 1/2 Antigen/Antibody (4th Generation) w Reflex [facility]; Future  Patient did give verbal consent for hep C and HIV screening  Follow-up in one month or sooner if needed  Subjective:      Patient ID: Ju Wagner is a 45 y o  male  Patient presents for follow-up  He has not been here in a couple of years  He states he has been busy with work  He has high blood pressure  Not on any medication at this time  Admits to poor diet and lack of exercise  He has hyperglycemia  Not on any medication at this time  Admits to poor diet and lack of exercise  He has elevated transaminase levels  He is morbidly obese, does not eat healthy and lacks physical activity  He has sleep apnea  He has gained weight and states his wife has said he is starting to snore again even with the CPAP machine  Believes he needs an adjustment  He has morbid obesity  He is aware of this  He would be interested in going to [employer]  The following portions of the patient's history were reviewed and updated as appropriate: He  has a past medical history of Basal cell carcinoma, CPAP (continuous positive airway pressure) dependence, GERD (gastroesophageal reflux disease), and Sleep apnea  He   Patient Active Problem List    Diagnosis Date Noted    Gastroesophageal reflux disease 09/20/2018    Essential hypertension 05/23/2018    Acid reflux 03/31/2017    Obesity 03/31/2017    Enlarged liver 03/21/2017    Fatty liver 03/21/2017    Hyperglycemia 02/14/2017    Transaminitis 02/14/2017    KRISTEN on CPAP 02/09/2017     He  has a past surgical history that includes Skin cancer excision; Abscess drainage; and pr esophagogastroduodenoscopy transoral diagnostic (N/A, 10/13/2018)    His family history includes Anxiety disorder in his mother; Cancer in his mother; Coronary artery disease in his father; Depression in his mother; Hyperlipidemia in his father and mother; Hypertension in his father and mother; Prostate cancer in his paternal grandfather and paternal uncle; Skin cancer in his mother  He  reports that he quit smoking about 7 years ago  His smoking use included cigarettes  He has never used smokeless tobacco  He reports current alcohol use of about 14 0 standard drinks of alcohol per week  He reports that he does not use drugs  Current Outpatient Medications   Medication Sig Dispense Refill    pantoprazole (PROTONIX) 40 mg tablet Take 1 tablet (40 mg total) by mouth daily 90 tablet 3    amLODIPine (NORVASC) 5 mg tablet Take 1 tablet (5 mg total) by mouth daily 30 tablet 1    Misc  Devices MISC Automatic blood pressure cuff to use daily as directed  1 each 0     No current facility-administered medications for this visit  Current Outpatient Medications on File Prior to Visit   Medication Sig    pantoprazole (PROTONIX) 40 mg tablet Take 1 tablet (40 mg total) by mouth daily    [DISCONTINUED] cyclobenzaprine (FLEXERIL) 10 mg tablet Take 1 tablet (10 mg total) by mouth 3 (three) times a day as needed for muscle spasms    [DISCONTINUED] ibuprofen (MOTRIN) 800 mg tablet Take 1 tablet (800 mg total) by mouth every 6 (six) hours as needed for mild pain     No current facility-administered medications on file prior to visit  He is allergic to ciprofloxacin       Review of Systems   Constitutional: Negative for fever  Respiratory: Negative for shortness of breath  Cardiovascular: Negative for chest pain  Objective:      /100 (BP Location: Left arm, Patient Position: Sitting, Cuff Size: Large)   Pulse 80   Temp 97 7 °F (36 5 °C)   Ht 6' 2" (1 88 m)   Wt (!) 158 kg (348 lb 12 8 oz)   SpO2 95%   BMI 44 78 kg/m²          Physical Exam  Constitutional:       Appearance: He is well-developed  He is obese     Cardiovascular: Rate and Rhythm: Normal rate and regular rhythm  Heart sounds: Normal heart sounds  Pulmonary:      Effort: Pulmonary effort is normal       Breath sounds: Normal breath sounds     Psychiatric:         Mood and Affect: Mood normal          Behavior: Behavior normal

## 2020-09-02 ENCOUNTER — TELEPHONE (OUTPATIENT)
Dept: FAMILY MEDICINE CLINIC | Facility: MEDICAL CENTER | Age: 50
End: 2020-09-02

## 2020-09-21 LAB
ALBUMIN SERPL-MCNC: 4.9 G/DL (ref 3.8–4.9)
ALBUMIN/CREAT UR: 22 MG/G CREAT (ref 0–29)
ALBUMIN/GLOB SERPL: 2 {RATIO} (ref 1.2–2.2)
ALP SERPL-CCNC: 69 IU/L (ref 39–117)
ALT SERPL-CCNC: 175 IU/L (ref 0–44)
APPEARANCE UR: ABNORMAL
AST SERPL-CCNC: 109 IU/L (ref 0–40)
BACTERIA URNS QL MICRO: ABNORMAL
BILIRUB SERPL-MCNC: 0.8 MG/DL (ref 0–1.2)
BILIRUB UR QL STRIP: NEGATIVE
BUN SERPL-MCNC: 11 MG/DL (ref 6–24)
BUN/CREAT SERPL: 11 (ref 9–20)
CALCIUM SERPL-MCNC: 9.4 MG/DL (ref 8.7–10.2)
CHLORIDE SERPL-SCNC: 104 MMOL/L (ref 96–106)
CHOLEST SERPL-MCNC: 159 MG/DL (ref 100–199)
CO2 SERPL-SCNC: 20 MMOL/L (ref 20–29)
COLOR UR: YELLOW
CREAT SERPL-MCNC: 1 MG/DL (ref 0.76–1.27)
CREAT UR-MCNC: 257.5 MG/DL
CRYSTALS URNS MICRO: ABNORMAL
EPI CELLS #/AREA URNS HPF: ABNORMAL /HPF (ref 0–10)
GGT SERPL-CCNC: 117 IU/L (ref 0–65)
GLOBULIN SER-MCNC: 2.4 G/DL (ref 1.5–4.5)
GLUCOSE SERPL-MCNC: 107 MG/DL (ref 65–99)
GLUCOSE UR QL: NEGATIVE
HBA1C MFR BLD: 5.8 % (ref 4.8–5.6)
HCV AB S/CO SERPL IA: 0.2 S/CO RATIO (ref 0–0.9)
HDLC SERPL-MCNC: 61 MG/DL
HGB UR QL STRIP: NEGATIVE
HIV 1+2 AB+HIV1 P24 AG SERPL QL IA: NON REACTIVE
KETONES UR QL STRIP: NEGATIVE
LDLC SERPL CALC-MCNC: 86 MG/DL (ref 0–99)
LEUKOCYTE ESTERASE UR QL STRIP: NEGATIVE
MICRO URNS: ABNORMAL
MICROALBUMIN UR-MCNC: 57.8 UG/ML
MUCOUS THREADS URNS QL MICRO: PRESENT
NITRITE UR QL STRIP: NEGATIVE
PH UR STRIP: 5.5 [PH] (ref 5–7.5)
POTASSIUM SERPL-SCNC: 4 MMOL/L (ref 3.5–5.2)
PROT SERPL-MCNC: 7.3 G/DL (ref 6–8.5)
PROT UR QL STRIP: ABNORMAL
RBC #/AREA URNS HPF: ABNORMAL /HPF (ref 0–2)
SL AMB EGFR AFRICAN AMERICAN: 100 ML/MIN/1.73
SL AMB EGFR NON AFRICAN AMERICAN: 87 ML/MIN/1.73
SL AMB VLDL CHOLESTEROL CALC: 12 MG/DL (ref 5–40)
SODIUM SERPL-SCNC: 141 MMOL/L (ref 134–144)
SP GR UR: 1.02 (ref 1–1.03)
T4 FREE SERPL-MCNC: 1.18 NG/DL (ref 0.82–1.77)
TRIGL SERPL-MCNC: 62 MG/DL (ref 0–149)
TSH SERPL DL<=0.005 MIU/L-ACNC: 1.26 UIU/ML (ref 0.45–4.5)
UNIDENT CRYS URNS QL MICRO: PRESENT
UROBILINOGEN UR STRIP-ACNC: 0.2 MG/DL (ref 0.2–1)
WBC #/AREA URNS HPF: ABNORMAL /HPF (ref 0–5)

## 2020-09-22 ENCOUNTER — OFFICE VISIT (OUTPATIENT)
Dept: FAMILY MEDICINE CLINIC | Facility: MEDICAL CENTER | Age: 50
End: 2020-09-22
Payer: COMMERCIAL

## 2020-09-22 VITALS
OXYGEN SATURATION: 96 % | TEMPERATURE: 97.2 F | DIASTOLIC BLOOD PRESSURE: 90 MMHG | BODY MASS INDEX: 40.43 KG/M2 | WEIGHT: 315 LBS | SYSTOLIC BLOOD PRESSURE: 139 MMHG | HEIGHT: 74 IN | HEART RATE: 79 BPM

## 2020-09-22 DIAGNOSIS — I10 ESSENTIAL HYPERTENSION: Primary | ICD-10-CM

## 2020-09-22 DIAGNOSIS — R73.9 HYPERGLYCEMIA: ICD-10-CM

## 2020-09-22 DIAGNOSIS — R74.01 TRANSAMINITIS: ICD-10-CM

## 2020-09-22 DIAGNOSIS — Z23 ENCOUNTER FOR IMMUNIZATION: ICD-10-CM

## 2020-09-22 DIAGNOSIS — Z00.00 PHYSICAL EXAM, ANNUAL: ICD-10-CM

## 2020-09-22 PROCEDURE — 99396 PREV VISIT EST AGE 40-64: CPT

## 2020-09-22 PROCEDURE — 90682 RIV4 VACC RECOMBINANT DNA IM: CPT

## 2020-09-22 PROCEDURE — 99214 OFFICE O/P EST MOD 30 MIN: CPT

## 2020-09-22 PROCEDURE — 1036F TOBACCO NON-USER: CPT

## 2020-09-22 PROCEDURE — 90471 IMMUNIZATION ADMIN: CPT

## 2020-09-22 RX ORDER — BENAZEPRIL HYDROCHLORIDE 10 MG/1
10 TABLET ORAL DAILY
Qty: 30 TABLET | Refills: 1 | Status: SHIPPED | OUTPATIENT
Start: 2020-09-22 | End: 2020-10-15

## 2020-09-22 NOTE — PROGRESS NOTES
Assessment/Plan:       Diagnoses and all orders for this visit:    Essential hypertension  -     benazepril (LOTENSIN) 10 mg tablet; Take 1 tablet (10 mg total) by mouth daily  Much improved  Renal function stable on recent labs  Continue amlodipine  Add benazepril 10 mg daily  Transaminitis  -     Hepatitis A antibody, total; Future  -     Hepatitis B surface antigen; Future  -     Iron Panel (Includes Ferritin, Iron Sat%, Iron, and TIBC); Future  -     Transferrin; Future  -     Hemochromatosis mutation; Future  -     US liver; Future  Liver enzymes remain elevated including ALT, AST and GGT  Hep C negative  Check additional labs as well as liver ultrasound  Hyperglycemia  A1c and glucose elevated on recent labs  Patient at risk for developing diabetes  Continuation of diet and exercise recommended  No medication at this time  Monitor  Follow-up in one month or sooner if needed  Subjective:      Patient ID: David Reinoso is a 45 y o  male  Patient presents for follow-up  He has hypertension  Currently on amlodipine  Tolerating medication well without any edema  Blood pressures have been better butter still running in the 428A to 411 systolic over 14S to 04'J diastolic  He has transaminitis  Recently had labs  Has cut back on alcohol intake  Has started to eat healthy and exercise regularly and has been able to lose some weight  He has hyperglycemia  Not on any medication  Has cut back on alcohol, is eating healthier and exercising regularly  Had labs recently  The following portions of the patient's history were reviewed and updated as appropriate: He  has a past medical history of Basal cell carcinoma, CPAP (continuous positive airway pressure) dependence, GERD (gastroesophageal reflux disease), and Sleep apnea    He   Patient Active Problem List    Diagnosis Date Noted    Gastroesophageal reflux disease 09/20/2018    Essential hypertension 05/23/2018    Acid reflux 03/31/2017    Obesity 03/31/2017    Enlarged liver 03/21/2017    Fatty liver 03/21/2017    Hyperglycemia 02/14/2017    Transaminitis 02/14/2017    KRISTEN on CPAP 02/09/2017     He  has a past surgical history that includes Skin cancer excision; Abscess drainage; and pr esophagogastroduodenoscopy transoral diagnostic (N/A, 10/13/2018)  His family history includes Anxiety disorder in his mother; Cancer in his mother; Coronary artery disease in his father; Depression in his mother; Hyperlipidemia in his father and mother; Hypertension in his father and mother; Prostate cancer in his paternal grandfather and paternal uncle; Skin cancer in his mother  He  reports that he quit smoking about 7 years ago  His smoking use included cigarettes  He has never used smokeless tobacco  He reports current alcohol use  He reports that he does not use drugs  Current Outpatient Medications   Medication Sig Dispense Refill    amLODIPine (NORVASC) 5 mg tablet Take 1 tablet (5 mg total) by mouth daily 30 tablet 1    Misc  Devices MISC Automatic blood pressure cuff to use daily as directed  1 each 0    pantoprazole (PROTONIX) 40 mg tablet Take 1 tablet (40 mg total) by mouth daily 90 tablet 3    benazepril (LOTENSIN) 10 mg tablet Take 1 tablet (10 mg total) by mouth daily 30 tablet 1     No current facility-administered medications for this visit  Current Outpatient Medications on File Prior to Visit   Medication Sig    amLODIPine (NORVASC) 5 mg tablet Take 1 tablet (5 mg total) by mouth daily    Misc  Devices MISC Automatic blood pressure cuff to use daily as directed   pantoprazole (PROTONIX) 40 mg tablet Take 1 tablet (40 mg total) by mouth daily     No current facility-administered medications on file prior to visit  He is allergic to ciprofloxacin       Review of Systems   Constitutional: Negative for fever  Respiratory: Negative for shortness of breath  Cardiovascular: Negative for chest pain  Gastrointestinal: Negative for abdominal pain and blood in stool  Genitourinary: Negative for dysuria and hematuria  Musculoskeletal: Negative for arthralgias and myalgias  Skin: Negative for rash  Neurological: Negative for headaches  Objective:      /90 (BP Location: Left arm, Patient Position: Sitting, Cuff Size: Large)   Pulse 79   Temp (!) 97 2 °F (36 2 °C)   Ht 6' 2" (1 88 m)   Wt (!) 152 kg (334 lb)   SpO2 96%   BMI 42 88 kg/m²          Physical Exam  Constitutional:       Appearance: He is well-developed  Comments: No nose, mouth or throat complaints  Nose, mouth and throat not examined  Mask in place  COVID-19 pandemic  HENT:      Head: Normocephalic and atraumatic  Right Ear: Tympanic membrane, ear canal and external ear normal       Left Ear: Tympanic membrane, ear canal and external ear normal    Eyes:      General: Lids are normal       Conjunctiva/sclera: Conjunctivae normal       Pupils: Pupils are equal, round, and reactive to light  Neck:      Trachea: Trachea normal    Cardiovascular:      Rate and Rhythm: Normal rate and regular rhythm  Heart sounds: S1 normal and S2 normal  No murmur  Pulmonary:      Effort: Pulmonary effort is normal       Breath sounds: Normal breath sounds  Abdominal:      General: Bowel sounds are normal       Palpations: Abdomen is soft  Tenderness: There is no abdominal tenderness  Musculoskeletal: Normal range of motion  Skin:     General: Skin is warm and dry  Neurological:      Mental Status: He is alert and oriented to person, place, and time  Psychiatric:         Speech: Speech normal          Behavior: Behavior normal          Thought Content:  Thought content normal

## 2020-09-22 NOTE — PROGRESS NOTES
Assessment/Plan:       Diagnoses and all orders for this visit:    Physical exam, annual  59-year-old male presenting for a physical exam   Colonoscopy up-to-date  Recent labs were reviewed today  BMI Counseling: Body mass index is 42 88 kg/m²  The BMI is above normal  Nutrition recommendations include decreasing overall calorie intake  Exercise recommendations include moderate aerobic physical activity for 150 minutes/week  Encounter for immunization  -     influenza vaccine, quadrivalent, recombinant, PF, 0 5 mL, for patients 18 yr+ (FLUBLOK)  Flu vaccine given and tolerated well  Follow-up in one month or sooner if needed  Subjective:      Patient ID: Denise Calloway is a 45 y o  male  Patient presents for a physical exam   Has been eating healthier and has been exercising more regularly  Has been able to lose weight  No longer smoking tobacco   Has cut back on his alcohol intake  No drug use  Colonoscopy up-to-date  Agreeable to the flu vaccine  Did have labs recently  The following portions of the patient's history were reviewed and updated as appropriate: He  has a past medical history of Basal cell carcinoma, CPAP (continuous positive airway pressure) dependence, GERD (gastroesophageal reflux disease), and Sleep apnea  He   Patient Active Problem List    Diagnosis Date Noted    Gastroesophageal reflux disease 09/20/2018    Essential hypertension 05/23/2018    Acid reflux 03/31/2017    Obesity 03/31/2017    Enlarged liver 03/21/2017    Fatty liver 03/21/2017    Hyperglycemia 02/14/2017    Transaminitis 02/14/2017    KRISTEN on CPAP 02/09/2017     He  has a past surgical history that includes Skin cancer excision; Abscess drainage; and pr esophagogastroduodenoscopy transoral diagnostic (N/A, 10/13/2018)    His family history includes Anxiety disorder in his mother; Cancer in his mother; Coronary artery disease in his father; Depression in his mother; Hyperlipidemia in his father and mother; Hypertension in his father and mother; Prostate cancer in his paternal grandfather and paternal uncle; Skin cancer in his mother  He  reports that he quit smoking about 7 years ago  His smoking use included cigarettes  He has never used smokeless tobacco  He reports current alcohol use  He reports that he does not use drugs  Current Outpatient Medications   Medication Sig Dispense Refill    amLODIPine (NORVASC) 5 mg tablet Take 1 tablet (5 mg total) by mouth daily 30 tablet 1    Misc  Devices MISC Automatic blood pressure cuff to use daily as directed  1 each 0    pantoprazole (PROTONIX) 40 mg tablet Take 1 tablet (40 mg total) by mouth daily 90 tablet 3    benazepril (LOTENSIN) 10 mg tablet Take 1 tablet (10 mg total) by mouth daily 30 tablet 1     No current facility-administered medications for this visit  Current Outpatient Medications on File Prior to Visit   Medication Sig    amLODIPine (NORVASC) 5 mg tablet Take 1 tablet (5 mg total) by mouth daily    Misc  Devices MISC Automatic blood pressure cuff to use daily as directed   pantoprazole (PROTONIX) 40 mg tablet Take 1 tablet (40 mg total) by mouth daily     No current facility-administered medications on file prior to visit  He is allergic to ciprofloxacin       Review of Systems   Constitutional: Negative for fever  Respiratory: Negative for shortness of breath  Cardiovascular: Negative for chest pain  Gastrointestinal: Negative for abdominal pain and blood in stool  Genitourinary: Negative for dysuria and hematuria  Musculoskeletal: Negative for arthralgias and myalgias  Skin: Negative for rash  Neurological: Negative for headaches           Objective:      /90 (BP Location: Left arm, Patient Position: Sitting, Cuff Size: Large)   Pulse 79   Temp (!) 97 2 °F (36 2 °C)   Ht 6' 2" (1 88 m)   Wt (!) 152 kg (334 lb)   SpO2 96%   BMI 42 88 kg/m²          Physical Exam  Constitutional: Appearance: He is well-developed  He is obese  Comments: No nose, mouth or throat complaints  Nose, mouth and throat not examined  Mask in place  COVID-19 pandemic  HENT:      Head: Normocephalic and atraumatic  Right Ear: Tympanic membrane, ear canal and external ear normal       Left Ear: Tympanic membrane, ear canal and external ear normal    Eyes:      General: Lids are normal       Conjunctiva/sclera: Conjunctivae normal       Pupils: Pupils are equal, round, and reactive to light  Neck:      Trachea: Trachea normal    Cardiovascular:      Rate and Rhythm: Normal rate and regular rhythm  Heart sounds: S1 normal and S2 normal  No murmur  Pulmonary:      Effort: Pulmonary effort is normal       Breath sounds: Normal breath sounds  Abdominal:      General: Bowel sounds are normal       Palpations: Abdomen is soft  Tenderness: There is no abdominal tenderness  Musculoskeletal: Normal range of motion  Skin:     General: Skin is warm and dry  Neurological:      Mental Status: He is alert and oriented to person, place, and time  Psychiatric:         Speech: Speech normal          Behavior: Behavior normal          Thought Content:  Thought content normal

## 2020-10-14 ENCOUNTER — TELEPHONE (OUTPATIENT)
Dept: OBGYN CLINIC | Facility: HOSPITAL | Age: 50
End: 2020-10-14

## 2020-10-14 ENCOUNTER — TELEPHONE (OUTPATIENT)
Dept: FAMILY MEDICINE CLINIC | Facility: MEDICAL CENTER | Age: 50
End: 2020-10-14

## 2020-10-15 DIAGNOSIS — I10 ESSENTIAL HYPERTENSION: ICD-10-CM

## 2020-10-15 RX ORDER — BENAZEPRIL HYDROCHLORIDE 10 MG/1
10 TABLET ORAL DAILY
Qty: 30 TABLET | Refills: 1 | Status: SHIPPED | OUTPATIENT
Start: 2020-10-15 | End: 2020-11-03 | Stop reason: ALTCHOICE

## 2020-10-30 LAB
FERRITIN SERPL-MCNC: 413 NG/ML (ref 30–400)
HAV AB SER QL IA: NEGATIVE
HBV SURFACE AG SERPL QL IA: NEGATIVE
HFE GENE MUT ANL BLD/T: NORMAL
IRON SATN MFR SERPL: 40 % (ref 15–55)
IRON SERPL-MCNC: 134 UG/DL (ref 38–169)
TIBC SERPL-MCNC: 334 UG/DL (ref 250–450)
TRANSFERRIN SERPL-MCNC: 270 MG/DL (ref 177–329)
UIBC SERPL-MCNC: 200 UG/DL (ref 111–343)

## 2020-11-01 ENCOUNTER — HOSPITAL ENCOUNTER (OUTPATIENT)
Dept: ULTRASOUND IMAGING | Facility: HOSPITAL | Age: 50
Discharge: HOME | End: 2020-11-01
Payer: COMMERCIAL

## 2020-11-01 DIAGNOSIS — R74.01 TRANSAMINITIS: ICD-10-CM

## 2020-11-01 PROCEDURE — 76705 ECHO EXAM OF ABDOMEN: CPT

## 2020-11-03 ENCOUNTER — OFFICE VISIT (OUTPATIENT)
Dept: FAMILY MEDICINE CLINIC | Facility: MEDICAL CENTER | Age: 50
End: 2020-11-03
Payer: COMMERCIAL

## 2020-11-03 VITALS
HEART RATE: 69 BPM | DIASTOLIC BLOOD PRESSURE: 86 MMHG | WEIGHT: 315 LBS | HEIGHT: 74 IN | SYSTOLIC BLOOD PRESSURE: 134 MMHG | OXYGEN SATURATION: 96 % | BODY MASS INDEX: 40.43 KG/M2 | TEMPERATURE: 98.3 F

## 2020-11-03 DIAGNOSIS — R16.0 ENLARGED LIVER: ICD-10-CM

## 2020-11-03 DIAGNOSIS — K76.0 FATTY LIVER: ICD-10-CM

## 2020-11-03 DIAGNOSIS — R74.01 TRANSAMINITIS: ICD-10-CM

## 2020-11-03 DIAGNOSIS — R73.9 HYPERGLYCEMIA: Primary | ICD-10-CM

## 2020-11-03 PROBLEM — I10 ESSENTIAL HYPERTENSION: Status: RESOLVED | Noted: 2018-05-23 | Resolved: 2020-11-03

## 2020-11-03 PROCEDURE — 99213 OFFICE O/P EST LOW 20 MIN: CPT | Performed by: FAMILY MEDICINE

## 2020-11-03 PROCEDURE — 3725F SCREEN DEPRESSION PERFORMED: CPT | Performed by: FAMILY MEDICINE

## 2020-11-03 PROCEDURE — 3079F DIAST BP 80-89 MM HG: CPT | Performed by: FAMILY MEDICINE

## 2020-11-03 PROCEDURE — 3008F BODY MASS INDEX DOCD: CPT | Performed by: FAMILY MEDICINE

## 2020-11-03 PROCEDURE — 1036F TOBACCO NON-USER: CPT | Performed by: FAMILY MEDICINE

## 2020-11-03 PROCEDURE — 3075F SYST BP GE 130 - 139MM HG: CPT | Performed by: FAMILY MEDICINE

## 2020-12-01 ENCOUNTER — TELEPHONE (OUTPATIENT)
Dept: FAMILY MEDICINE CLINIC | Facility: MEDICAL CENTER | Age: 50
End: 2020-12-01

## 2020-12-01 ENCOUNTER — TELEPHONE (OUTPATIENT)
Dept: OTHER | Facility: OTHER | Age: 50
End: 2020-12-01

## 2020-12-01 DIAGNOSIS — Z20.822 EXPOSURE TO COVID-19 VIRUS: Primary | ICD-10-CM

## 2021-01-06 ENCOUNTER — APPOINTMENT (OUTPATIENT)
Dept: RADIOLOGY | Facility: CLINIC | Age: 51
End: 2021-01-06
Payer: COMMERCIAL

## 2021-01-06 ENCOUNTER — OFFICE VISIT (OUTPATIENT)
Dept: OBGYN CLINIC | Facility: CLINIC | Age: 51
End: 2021-01-06
Payer: COMMERCIAL

## 2021-01-06 VITALS
BODY MASS INDEX: 40.43 KG/M2 | SYSTOLIC BLOOD PRESSURE: 151 MMHG | DIASTOLIC BLOOD PRESSURE: 96 MMHG | HEART RATE: 65 BPM | HEIGHT: 74 IN | WEIGHT: 315 LBS

## 2021-01-06 DIAGNOSIS — M22.2X1 PATELLOFEMORAL SYNDROME OF BOTH KNEES: ICD-10-CM

## 2021-01-06 DIAGNOSIS — M22.2X2 PATELLOFEMORAL SYNDROME OF BOTH KNEES: ICD-10-CM

## 2021-01-06 DIAGNOSIS — M17.0 PRIMARY OSTEOARTHRITIS OF BOTH KNEES: Primary | ICD-10-CM

## 2021-01-06 DIAGNOSIS — M25.651 JOINT STIFFNESS OF BOTH HIPS: ICD-10-CM

## 2021-01-06 DIAGNOSIS — R29.898 WEAKNESS OF BOTH HIPS: ICD-10-CM

## 2021-01-06 DIAGNOSIS — M17.0 PRIMARY OSTEOARTHRITIS OF BOTH KNEES: ICD-10-CM

## 2021-01-06 DIAGNOSIS — M25.652 JOINT STIFFNESS OF BOTH HIPS: ICD-10-CM

## 2021-01-06 DIAGNOSIS — M62.9 HAMSTRING TIGHTNESS OF BOTH LOWER EXTREMITIES: ICD-10-CM

## 2021-01-06 DIAGNOSIS — M53.3 SACROILIAC JOINT DYSFUNCTION OF BOTH SIDES: ICD-10-CM

## 2021-01-06 PROCEDURE — 99214 OFFICE O/P EST MOD 30 MIN: CPT | Performed by: FAMILY MEDICINE

## 2021-01-06 PROCEDURE — 3077F SYST BP >= 140 MM HG: CPT | Performed by: FAMILY MEDICINE

## 2021-01-06 PROCEDURE — 73562 X-RAY EXAM OF KNEE 3: CPT

## 2021-01-06 PROCEDURE — 1036F TOBACCO NON-USER: CPT | Performed by: FAMILY MEDICINE

## 2021-01-06 PROCEDURE — 3008F BODY MASS INDEX DOCD: CPT | Performed by: FAMILY MEDICINE

## 2021-01-06 PROCEDURE — 3080F DIAST BP >= 90 MM HG: CPT | Performed by: FAMILY MEDICINE

## 2021-01-06 RX ORDER — DICLOFENAC SODIUM 75 MG/1
75 TABLET, DELAYED RELEASE ORAL 2 TIMES DAILY
Qty: 60 TABLET | Refills: 1 | Status: SHIPPED | OUTPATIENT
Start: 2021-01-06 | End: 2021-07-16

## 2021-01-06 NOTE — LETTER
January 6, 2021     DO Elio [address]     Patient: Anat Agrawal   YOB: 1970   Date of Visit: 1/6/2021       Dear Dr Maria Isabel Hi: Thank you for referring Makeda Nesbitt to me for evaluation  Below are my notes for this consultation  If you have questions, please do not hesitate to call me  I look forward to following your patient along with you  Sincerely,        [employer], DO        CC: No Recipients  [employer], DO  1/6/2021  4:49 PM  Sign when Signing Visit  Assessment/Plan:  Assessment/Plan   Diagnoses and all orders for this visit:    Primary osteoarthritis of both knees  -     XR knee 3 vw right non injury; Future  -     XR knee 3 vw left non injury; Future  -     diclofenac (VOLTAREN) 75 mg EC tablet; Take 1 tablet (75 mg total) by mouth 2 (two) times a day  -     Ambulatory referral to Physical Therapy; Future    Patellofemoral syndrome of both knees  -     Ambulatory referral to Physical Therapy; Future    Hamstring tightness of both lower extremities  -     Ambulatory referral to Physical Therapy; Future    Joint stiffness of both hips  -     Ambulatory referral to Physical Therapy; Future    Sacroiliac joint dysfunction of both sides  -     Ambulatory referral to Physical Therapy; Future    Weakness of both hips  -     Ambulatory referral to Physical Therapy; Future      19-year-old active male with bilateral knee pain of more than 10 years duration  Discussed with patient physical exam, radiographs, impression and plan  X-rays of the knees noted for mild medial joint space narrowing  Physical exam of the knees noted for mild swelling  There is mild tenderness medial joint line  He has full extension and flexion to 120° both knees  There is no appreciable collateral ligament laxity  There is palpable crepitus with range of motion  He has hamstring tightness both lower extremities   There is stiffness with both hips with internal rotation and weakness both hips with abduction  Clinical impression is that he is symptomatic from combination of osteoarthritis and patellofemoral syndrome  I discussed treatment regimen of anti-inflammatory, supplements, and physical therapy  He declines corticosteroid injection at this time  He is to take diclofenac 75 mg twice daily with food consistently for 30 days and during that time not to take any ibuprofen or Aleve, but may take Tylenol  He is to start taking tumeric 500 mg twice daily, tart cherry 1000 mg daily, and glucosamine-chondroitin supplement 2 to 3 times a day  He may apply topical Voltaren gel 3 to 4 times a day he may apply topical CBD containing less than 0 3% THC  He is to start physical therapy as soon as possible and do home exercises as directed  He will follow up in 5 weeks at which point he will be re-evaluated  Subjective:   Patient ID: Chris Mead is a 45 y o  male  Chief Complaint   Patient presents with    Right Knee - Pain    Left Knee - Pain       58-year-old active male presents for evaluation of bilateral knee pain of more than 10 years duration  He denies particular trauma or inciting event  He has history of being physically active in multiple sports including football, basketball, and a catcher in baseball  He also does strenuous work and with heavy lifting and from frequently going up and down ladders throughout the day  He has been experiencing pain described as generalized to the knees, gradual in onset, achy and sometimes sharp, nonradiating, worse with prolonged standing and ambulation, worse with stairs, worse when rising from prolonged sitting, and improved with resting or changing position  He commutes about 2 hours to work and after long rides has stiffness and pain in the knees  After work he ices the knees    He has made lifestyle changes for weight loss goals and has been more active with walking on treadmill, reports that after walking he also has pain  He takes ibuprofen at night to help with sleep  Knee Pain  This is a chronic problem  The current episode started more than 1 year ago  The problem occurs daily  The problem has been gradually worsening  Associated symptoms include arthralgias and joint swelling  Pertinent negatives include no abdominal pain, chest pain, chills, fever, numbness, rash, sore throat or weakness  The symptoms are aggravated by standing and walking (Stairs)  He has tried rest, NSAIDs and ice (Weight loss) for the symptoms  The treatment provided mild relief  The following portions of the patient's history were reviewed and updated as appropriate: He  has a past medical history of Basal cell carcinoma, CPAP (continuous positive airway pressure) dependence, Essential hypertension (5/23/2018), GERD (gastroesophageal reflux disease), and Sleep apnea  He  has a past surgical history that includes Skin cancer excision; Abscess drainage; and pr esophagogastroduodenoscopy transoral diagnostic (N/A, 10/13/2018)  His family history includes Anxiety disorder in his mother; Cancer in his mother; Coronary artery disease in his father; Depression in his mother; Hyperlipidemia in his father and mother; Hypertension in his father and mother; Prostate cancer in his paternal grandfather and paternal uncle; Skin cancer in his mother  He  reports that he quit smoking about 8 years ago  His smoking use included cigarettes  He has never used smokeless tobacco  He reports current alcohol use  He reports that he does not use drugs  He is allergic to ciprofloxacin       Review of Systems   Constitutional: Negative for chills and fever  HENT: Negative for sore throat  Eyes: Negative for visual disturbance  Respiratory: Negative for shortness of breath  Cardiovascular: Negative for chest pain  Gastrointestinal: Negative for abdominal pain  Genitourinary: Negative for flank pain     Musculoskeletal: Positive for arthralgias and joint swelling  Skin: Negative for rash and wound  Neurological: Negative for weakness and numbness  Hematological: Does not bruise/bleed easily  Psychiatric/Behavioral: Negative for self-injury  Objective:  Vitals:    01/06/21 1546   BP: 151/96   Pulse: 65   Weight: (!) 152 kg (336 lb)   Height: 6' 2" (1 88 m)       Right Ankle Exam     Tenderness   The patient is experiencing no tenderness  Swelling: none    Muscle Strength   Dorsiflexion:  5/5  Plantar flexion:  5/5      Left Ankle Exam     Tenderness   The patient is experiencing no tenderness  Swelling: none    Muscle Strength   Dorsiflexion:  5/5   Plantar flexion:  5/5       Right Knee Exam     Muscle Strength   The patient has normal right knee strength  Tenderness   The patient is experiencing tenderness in the medial joint line  Range of Motion   Extension: normal   Flexion: 120     Tests   Varus: negative Valgus: negative    Other   Swelling: mild    Comments:  Crepitus      Left Knee Exam     Muscle Strength   The patient has normal left knee strength  Tenderness   The patient is experiencing tenderness in the medial joint line      Range of Motion   Extension: normal   Flexion: 120     Tests   Varus: negative Valgus: negative    Other   Swelling: mild    Comments:  Crepitus      Right Hip Exam     Range of Motion   External rotation: 30   Internal rotation: 5     Muscle Strength   Abduction: 4/5   Flexion: 5/5     Tests   RIAN: negative    Comments:  Negative FADDIR  Hamstring tightness      Left Hip Exam     Range of Motion   External rotation: 30   Internal rotation: 5     Muscle Strength   Abduction: 4/5   Flexion: 5/5     Tests   RIAN: negative    Comments:  Negative FADDIR  Hamstring tightness          Strength/Myotome Testing     Left Ankle/Foot   Dorsiflexion: 5  Plantar flexion: 5    Right Ankle/Foot   Dorsiflexion: 5  Plantar flexion: 5      Physical Exam  Vitals signs and nursing note reviewed  Constitutional:       General: He is not in acute distress  Appearance: He is well-developed  He is not ill-appearing or diaphoretic  HENT:      Head: Normocephalic and atraumatic  Right Ear: External ear normal       Left Ear: External ear normal    Eyes:      Conjunctiva/sclera: Conjunctivae normal    Neck:      Trachea: No tracheal deviation  Cardiovascular:      Rate and Rhythm: Normal rate  Pulmonary:      Effort: Pulmonary effort is normal  No respiratory distress  Abdominal:      General: There is no distension  Musculoskeletal:         General: Swelling and tenderness present  No deformity or signs of injury  Right lower leg: No edema  Left lower leg: No edema  Skin:     General: Skin is warm and dry  Coloration: Skin is not jaundiced or pale  Neurological:      Mental Status: He is alert and oriented to person, place, and time  Psychiatric:         Mood and Affect: Mood normal          Behavior: Behavior normal          Thought Content: Thought content normal          Judgment: Judgment normal          I have personally reviewed pertinent films in PACS and my interpretation is Mild medial joint space narrowing of both knees

## 2021-01-06 NOTE — PROGRESS NOTES
Assessment/Plan:  Assessment/Plan   Diagnoses and all orders for this visit:    Primary osteoarthritis of both knees  -     XR knee 3 vw right non injury; Future  -     XR knee 3 vw left non injury; Future  -     diclofenac (VOLTAREN) 75 mg EC tablet; Take 1 tablet (75 mg total) by mouth 2 (two) times a day  -     Ambulatory referral to Physical Therapy; Future    Patellofemoral syndrome of both knees  -     Ambulatory referral to Physical Therapy; Future    Hamstring tightness of both lower extremities  -     Ambulatory referral to Physical Therapy; Future    Joint stiffness of both hips  -     Ambulatory referral to Physical Therapy; Future    Sacroiliac joint dysfunction of both sides  -     Ambulatory referral to Physical Therapy; Future    Weakness of both hips  -     Ambulatory referral to Physical Therapy; Future      70-year-old active male with bilateral knee pain of more than 10 years duration  Discussed with patient physical exam, radiographs, impression and plan  X-rays of the knees noted for mild medial joint space narrowing  Physical exam of the knees noted for mild swelling  There is mild tenderness medial joint line  He has full extension and flexion to 120° both knees  There is no appreciable collateral ligament laxity  There is palpable crepitus with range of motion  He has hamstring tightness both lower extremities  There is stiffness with both hips with internal rotation and weakness both hips with abduction  Clinical impression is that he is symptomatic from combination of osteoarthritis and patellofemoral syndrome  I discussed treatment regimen of anti-inflammatory, supplements, and physical therapy  He declines corticosteroid injection at this time  He is to take diclofenac 75 mg twice daily with food consistently for 30 days and during that time not to take any ibuprofen or Aleve, but may take Tylenol    He is to start taking tumeric 500 mg twice daily, tart cherry 1000 mg daily, and glucosamine-chondroitin supplement 2 to 3 times a day  He may apply topical Voltaren gel 3 to 4 times a day he may apply topical CBD containing less than 0 3% THC  He is to start physical therapy as soon as possible and do home exercises as directed  He will follow up in 5 weeks at which point he will be re-evaluated  Subjective:   Patient ID: Christine Carpenter is a 45 y o  male  Chief Complaint   Patient presents with    Right Knee - Pain    Left Knee - Pain       77-year-old active male presents for evaluation of bilateral knee pain of more than 10 years duration  He denies particular trauma or inciting event  He has history of being physically active in multiple sports including football, basketball, and a catcher in baseball  He also does strenuous work and with heavy lifting and from frequently going up and down ladders throughout the day  He has been experiencing pain described as generalized to the knees, gradual in onset, achy and sometimes sharp, nonradiating, worse with prolonged standing and ambulation, worse with stairs, worse when rising from prolonged sitting, and improved with resting or changing position  He commutes about 2 hours to work and after long rides has stiffness and pain in the knees  After work he ices the knees  He has made lifestyle changes for weight loss goals and has been more active with walking on treadmill, reports that after walking he also has pain  He takes ibuprofen at night to help with sleep  Knee Pain  This is a chronic problem  The current episode started more than 1 year ago  The problem occurs daily  The problem has been gradually worsening  Associated symptoms include arthralgias and joint swelling  Pertinent negatives include no abdominal pain, chest pain, chills, fever, numbness, rash, sore throat or weakness  The symptoms are aggravated by standing and walking (Stairs)  He has tried rest, NSAIDs and ice (Weight loss) for the symptoms   The treatment provided mild relief  The following portions of the patient's history were reviewed and updated as appropriate: He  has a past medical history of Basal cell carcinoma, CPAP (continuous positive airway pressure) dependence, Essential hypertension (5/23/2018), GERD (gastroesophageal reflux disease), and Sleep apnea  He  has a past surgical history that includes Skin cancer excision; Abscess drainage; and pr esophagogastroduodenoscopy transoral diagnostic (N/A, 10/13/2018)  His family history includes Anxiety disorder in his mother; Cancer in his mother; Coronary artery disease in his father; Depression in his mother; Hyperlipidemia in his father and mother; Hypertension in his father and mother; Prostate cancer in his paternal grandfather and paternal uncle; Skin cancer in his mother  He  reports that he quit smoking about 8 years ago  His smoking use included cigarettes  He has never used smokeless tobacco  He reports current alcohol use  He reports that he does not use drugs  He is allergic to ciprofloxacin       Review of Systems   Constitutional: Negative for chills and fever  HENT: Negative for sore throat  Eyes: Negative for visual disturbance  Respiratory: Negative for shortness of breath  Cardiovascular: Negative for chest pain  Gastrointestinal: Negative for abdominal pain  Genitourinary: Negative for flank pain  Musculoskeletal: Positive for arthralgias and joint swelling  Skin: Negative for rash and wound  Neurological: Negative for weakness and numbness  Hematological: Does not bruise/bleed easily  Psychiatric/Behavioral: Negative for self-injury  Objective:  Vitals:    01/06/21 1546   BP: 151/96   Pulse: 65   Weight: (!) 152 kg (336 lb)   Height: 6' 2" (1 88 m)       Right Ankle Exam     Tenderness   The patient is experiencing no tenderness    Swelling: none    Muscle Strength   Dorsiflexion:  5/5  Plantar flexion:  5/5      Left Ankle Exam Tenderness   The patient is experiencing no tenderness  Swelling: none    Muscle Strength   Dorsiflexion:  5/5   Plantar flexion:  5/5       Right Knee Exam     Muscle Strength   The patient has normal right knee strength  Tenderness   The patient is experiencing tenderness in the medial joint line  Range of Motion   Extension: normal   Flexion: 120     Tests   Varus: negative Valgus: negative    Other   Swelling: mild    Comments:  Crepitus      Left Knee Exam     Muscle Strength   The patient has normal left knee strength  Tenderness   The patient is experiencing tenderness in the medial joint line  Range of Motion   Extension: normal   Flexion: 120     Tests   Varus: negative Valgus: negative    Other   Swelling: mild    Comments:  Crepitus      Right Hip Exam     Range of Motion   External rotation: 30   Internal rotation: 5     Muscle Strength   Abduction: 4/5   Flexion: 5/5     Tests   RIAN: negative    Comments:  Negative FADDIR  Hamstring tightness      Left Hip Exam     Range of Motion   External rotation: 30   Internal rotation: 5     Muscle Strength   Abduction: 4/5   Flexion: 5/5     Tests   RIAN: negative    Comments:  Negative FADDIR  Hamstring tightness          Strength/Myotome Testing     Left Ankle/Foot   Dorsiflexion: 5  Plantar flexion: 5    Right Ankle/Foot   Dorsiflexion: 5  Plantar flexion: 5      Physical Exam  Vitals signs and nursing note reviewed  Constitutional:       General: He is not in acute distress  Appearance: He is well-developed  He is not ill-appearing or diaphoretic  HENT:      Head: Normocephalic and atraumatic  Right Ear: External ear normal       Left Ear: External ear normal    Eyes:      Conjunctiva/sclera: Conjunctivae normal    Neck:      Trachea: No tracheal deviation  Cardiovascular:      Rate and Rhythm: Normal rate  Pulmonary:      Effort: Pulmonary effort is normal  No respiratory distress     Abdominal:      General: There is no distension  Musculoskeletal:         General: Swelling and tenderness present  No deformity or signs of injury  Right lower leg: No edema  Left lower leg: No edema  Skin:     General: Skin is warm and dry  Coloration: Skin is not jaundiced or pale  Neurological:      Mental Status: He is alert and oriented to person, place, and time  Psychiatric:         Mood and Affect: Mood normal          Behavior: Behavior normal          Thought Content: Thought content normal          Judgment: Judgment normal          I have personally reviewed pertinent films in PACS and my interpretation is Mild medial joint space narrowing of both knees

## 2021-02-20 ENCOUNTER — OFFICE VISIT (OUTPATIENT)
Dept: OBGYN CLINIC | Facility: CLINIC | Age: 51
End: 2021-02-20
Payer: COMMERCIAL

## 2021-02-20 VITALS
HEART RATE: 86 BPM | DIASTOLIC BLOOD PRESSURE: 93 MMHG | WEIGHT: 315 LBS | BODY MASS INDEX: 40.43 KG/M2 | HEIGHT: 74 IN | SYSTOLIC BLOOD PRESSURE: 142 MMHG

## 2021-02-20 DIAGNOSIS — M17.0 PRIMARY OSTEOARTHRITIS OF BOTH KNEES: Primary | ICD-10-CM

## 2021-02-20 DIAGNOSIS — M25.462 EFFUSION OF LEFT KNEE: ICD-10-CM

## 2021-02-20 PROCEDURE — 99213 OFFICE O/P EST LOW 20 MIN: CPT | Performed by: FAMILY MEDICINE

## 2021-02-20 PROCEDURE — 20610 DRAIN/INJ JOINT/BURSA W/O US: CPT | Performed by: FAMILY MEDICINE

## 2021-02-20 RX ORDER — LIDOCAINE HYDROCHLORIDE 10 MG/ML
4 INJECTION, SOLUTION INFILTRATION; PERINEURAL
Status: COMPLETED | OUTPATIENT
Start: 2021-02-20 | End: 2021-02-20

## 2021-02-20 RX ORDER — LIDOCAINE HYDROCHLORIDE 10 MG/ML
5 INJECTION, SOLUTION INFILTRATION; PERINEURAL
Status: COMPLETED | OUTPATIENT
Start: 2021-02-20 | End: 2021-02-20

## 2021-02-20 RX ORDER — TRIAMCINOLONE ACETONIDE 40 MG/ML
40 INJECTION, SUSPENSION INTRA-ARTICULAR; INTRAMUSCULAR
Status: COMPLETED | OUTPATIENT
Start: 2021-02-20 | End: 2021-02-20

## 2021-02-20 RX ADMIN — LIDOCAINE HYDROCHLORIDE 5 ML: 10 INJECTION, SOLUTION INFILTRATION; PERINEURAL at 09:30

## 2021-02-20 RX ADMIN — TRIAMCINOLONE ACETONIDE 40 MG: 40 INJECTION, SUSPENSION INTRA-ARTICULAR; INTRAMUSCULAR at 09:30

## 2021-02-20 RX ADMIN — LIDOCAINE HYDROCHLORIDE 4 ML: 10 INJECTION, SOLUTION INFILTRATION; PERINEURAL at 09:30

## 2021-02-20 NOTE — PROGRESS NOTES
Assessment/Plan:  Assessment/Plan   Diagnoses and all orders for this visit:    Primary osteoarthritis of both knees    Effusion of left knee  -     Large joint arthrocentesis: L knee          65-year-old active male with osteoarthritis of both knees with bilateral knee pain of more than 10 years duration  Discussed with patient physical exam, impression and plan  Left knee is noted for effusion  He has tenderness of the left knee at the medial and lateral joint lines  He has range of motion limited extension of -5 and flexion to 110  There is no appreciable collateral ligament laxity  Clinical impression that he is symptomatic from flare of osteoarthritis  I offered treatment in form of aspiration with corticosteroid injection to which he agreed  I aspirated 60 cc of yellow blood-tinged fluid and administered mixture of 4 cc 1% lidocaine and 1 cc Kenalog to left knee without complication  He may continue taking diclofenac 75 mg twice daily  He will follow up in 6 weeks at which point he will be re-evaluated  Subjective:   Patient ID: Maci Palmer is a 45 y o  male  Chief Complaint   Patient presents with    Left Knee - Follow-up, Pain    Right Knee - Follow-up         65-year-old active male with osteoarthritis of both knees following up for bilateral knee pain of more than 10 years duration  He was last seen by me 6 weeks ago at which point he was prescribed diclofenac 75 mg twice daily, advised on taking supplements, and referred to formal physical therapy  He reports that over the past 2 weeks left knee pain has been gradually worsening  Pain described generalized to the knee but worse at the medial aspect, constant, throbbing and sometimes sharp, nonradiating, worse with standing and ambulation, associated swelling and limited range of motion, and improved with resting  He has been icing and taking diclofenac as directed  Knee Pain  This is a chronic problem   The current episode started more than 1 year ago  The problem occurs constantly  The problem has been gradually worsening  Associated symptoms include arthralgias and joint swelling  Pertinent negatives include no numbness or weakness  The symptoms are aggravated by standing, walking, twisting and bending  He has tried rest, NSAIDs, ice and position changes (Supplements) for the symptoms  The treatment provided mild relief  Review of Systems   Musculoskeletal: Positive for arthralgias and joint swelling  Neurological: Negative for weakness and numbness  Objective:  Vitals:    02/20/21 0811   BP: 142/93   Pulse: 86   Weight: (!) 152 kg (334 lb)   Height: 6' 2" (1 88 m)     Left Knee Exam     Muscle Strength   The patient has normal left knee strength  Tenderness   The patient is experiencing tenderness in the lateral joint line and medial joint line  Range of Motion   Extension: -5   Flexion: 110     Tests   Varus: negative Valgus: negative    Other   Effusion: effusion present          Observations   Left Knee   Positive for effusion  Physical Exam  Vitals signs and nursing note reviewed  Constitutional:       General: He is not in acute distress  Appearance: He is well-developed  HENT:      Head: Normocephalic and atraumatic  Right Ear: External ear normal       Left Ear: External ear normal    Eyes:      Conjunctiva/sclera: Conjunctivae normal    Neck:      Trachea: No tracheal deviation  Cardiovascular:      Rate and Rhythm: Normal rate  Pulmonary:      Effort: Pulmonary effort is normal  No respiratory distress  Abdominal:      General: There is no distension  Musculoskeletal:         General: Swelling and tenderness present  Left knee: He exhibits effusion  Skin:     General: Skin is warm and dry  Neurological:      Mental Status: He is alert and oriented to person, place, and time     Psychiatric:         Behavior: Behavior normal          Large joint arthrocentesis: L knee  Universal Protocol:  Consent: Verbal consent obtained  Risks and benefits: risks, benefits and alternatives were discussed  Consent given by: patient  Time out: Immediately prior to procedure a "time out" was called to verify the correct patient, procedure, equipment, support staff and site/side marked as required  Timeout called at: 2/20/2021 8:40 AM   Patient understanding: patient states understanding of the procedure being performed  Patient consent: the patient's understanding of the procedure matches consent given  Procedure consent: procedure consent matches procedure scheduled  Relevant documents: relevant documents present and verified  Test results: test results available and properly labeled  Site marked: the operative site was marked  Radiology Images displayed and confirmed   If images not available, report reviewed: imaging studies available  Required items: required blood products, implants, devices, and special equipment available  Patient identity confirmed: verbally with patient    Supporting Documentation  Indications: joint swelling and pain   Procedure Details  Location: knee - L knee  Preparation: Patient was prepped and draped in the usual sterile fashion  Needle size: 18 G  Ultrasound guidance: no  Approach: superior  Medications administered: 5 mL lidocaine 1 %; 4 mL lidocaine 1 %; 40 mg triamcinolone acetonide 40 mg/mL    Aspirate amount: 60 mL  Aspirate: yellow and blood-tinged    Patient tolerance: patient tolerated the procedure well with no immediate complications  Dressing:  Sterile dressing applied

## 2021-02-25 ENCOUNTER — TELEPHONE (OUTPATIENT)
Dept: OBGYN CLINIC | Facility: OTHER | Age: 51
End: 2021-02-25

## 2021-02-25 NOTE — TELEPHONE ENCOUNTER
Patients wife called in stating that husbands knee is no better  Patient is icing and doing the medication that was recommended  He has been resting his knee  The swelling has gone away    Wife was not sure if he should come in sooner for an appointment or of there is something else we could do in the mean time      C/b # [phone]

## 2021-02-26 NOTE — TELEPHONE ENCOUNTER
Patients wife called back and stated that her  is using the Voltaren Gel 3-4 times a day and it isn't working   They are looking for some relief or should he be seen again by the Dr Lindsay Morgan - [phone]

## 2021-03-03 DIAGNOSIS — M17.0 PRIMARY OSTEOARTHRITIS OF BOTH KNEES: ICD-10-CM

## 2021-03-11 ENCOUNTER — OFFICE VISIT (OUTPATIENT)
Dept: OBGYN CLINIC | Facility: CLINIC | Age: 51
End: 2021-03-11
Payer: COMMERCIAL

## 2021-03-11 VITALS
WEIGHT: 315 LBS | SYSTOLIC BLOOD PRESSURE: 142 MMHG | BODY MASS INDEX: 40.43 KG/M2 | HEART RATE: 69 BPM | DIASTOLIC BLOOD PRESSURE: 84 MMHG | HEIGHT: 74 IN

## 2021-03-11 DIAGNOSIS — S89.92XD ACUTE INJURY OF LEFT KNEE CARTILAGE, SUBSEQUENT ENCOUNTER: Primary | ICD-10-CM

## 2021-03-11 PROCEDURE — 99213 OFFICE O/P EST LOW 20 MIN: CPT | Performed by: FAMILY MEDICINE

## 2021-03-11 RX ORDER — OXYCODONE HYDROCHLORIDE AND ACETAMINOPHEN 5; 325 MG/1; MG/1
1 TABLET ORAL 2 TIMES DAILY PRN
Qty: 10 TABLET | Refills: 0 | Status: SHIPPED | OUTPATIENT
Start: 2021-03-11 | End: 2021-07-16

## 2021-03-11 RX ORDER — IBUPROFEN 800 MG/1
800 TABLET ORAL 3 TIMES DAILY PRN
Qty: 90 TABLET | Refills: 0 | Status: SHIPPED | OUTPATIENT
Start: 2021-03-11 | End: 2021-07-16

## 2021-03-11 NOTE — PROGRESS NOTES
Assessment/Plan:  Assessment/Plan   Diagnoses and all orders for this visit:    Acute injury of left knee cartilage, subsequent encounter  -     ibuprofen (MOTRIN) 800 mg tablet; Take 1 tablet (800 mg total) by mouth 3 (three) times a day as needed for mild pain  -     oxyCODONE-acetaminophen (PERCOCET) 5-325 mg per tablet; Take 1 tablet by mouth 2 (two) times a day as needed for moderate painMax Daily Amount: 2 tablets  -     MRI knee left  wo contrast; Future        34-year-old male with left knee pain of more than 10 years duration  Discussed with patient physical exam, impression and plan  Physical noted for effusion of the knee  He has tenderness at the medial and lateral joint line  He has range of motion limited to extension of -5° and flexion to 110°  There is positive Daniel's at the medial aspect knee  He still having symptoms despite conservative management in the form of diclofenac 75 mg twice daily since 01/06/2021, icing, Tylenol, corticosteroid injection, and physician directed home exercises since 01/06/2021  At this time I will refer him for MRI of the knee to evaluate for meniscus tear and cartilage defect as surgical intervention may be warranted  He will follow up after getting MRI done  Subjective:   Patient ID: Kiko Yang is a 45 y o  male  Chief Complaint   Patient presents with    Left Knee - Follow-up, Pain       34-year-old male following up for left knee pain of more than 10 years duration  He was last seen by me 3 weeks ago at which point he underwent left knee aspiration and corticosteroid injection  He states that symptoms have been worsening over the past few weeks  Pain is described as generalized to the knees but worse at the anterior medial aspects, constant, achy and throbbing and sometimes sharp, worse with bearing weight and ambulation, associated swelling, and improved with resting    He has been taking diclofenac 75 mg twice daily, applying topical Voltaren gel, icing daily, and continuing with home exercises  Knee Pain  This is a chronic problem  The current episode started more than 1 year ago  The problem occurs constantly  The problem has been gradually worsening  Associated symptoms include arthralgias and joint swelling  Pertinent negatives include no numbness or weakness  The symptoms are aggravated by standing, walking, twisting and bending  He has tried rest, NSAIDs, ice, acetaminophen and position changes (Corticosteroid injection, home exercise) for the symptoms  The treatment provided mild relief  Review of Systems   Musculoskeletal: Positive for arthralgias and joint swelling  Neurological: Negative for weakness and numbness  Objective:  Vitals:    03/11/21 1553   BP: 142/84   Pulse: 69   Weight: (!) 152 kg (335 lb)   Height: 6' 2" (1 88 m)     Left Knee Exam     Muscle Strength   The patient has normal left knee strength  Tenderness   The patient is experiencing tenderness in the lateral joint line and medial joint line  Range of Motion   Extension: -5   Flexion: 110     Tests   Daniel:  Medial - positive   Varus: negative Valgus: negative    Other   Effusion: effusion present          Observations   Left Knee   Positive for effusion  Physical Exam  Vitals signs and nursing note reviewed  Constitutional:       General: He is not in acute distress  Appearance: He is well-developed  HENT:      Head: Normocephalic and atraumatic  Right Ear: External ear normal       Left Ear: External ear normal    Eyes:      Conjunctiva/sclera: Conjunctivae normal    Neck:      Trachea: No tracheal deviation  Cardiovascular:      Rate and Rhythm: Normal rate  Pulmonary:      Effort: Pulmonary effort is normal  No respiratory distress  Abdominal:      General: There is no distension  Musculoskeletal:         General: Swelling and tenderness present  Left knee: He exhibits effusion     Skin:     General: Skin is warm and dry  Neurological:      Mental Status: He is alert and oriented to person, place, and time     Psychiatric:         Behavior: Behavior normal

## 2021-03-15 ENCOUNTER — HOSPITAL ENCOUNTER (OUTPATIENT)
Dept: MRI IMAGING | Facility: HOSPITAL | Age: 51
Discharge: HOME | End: 2021-03-15
Attending: FAMILY MEDICINE
Payer: COMMERCIAL

## 2021-03-15 DIAGNOSIS — S89.92XD ACUTE INJURY OF LEFT KNEE CARTILAGE, SUBSEQUENT ENCOUNTER: ICD-10-CM

## 2021-03-15 PROCEDURE — G1004 CDSM NDSC: HCPCS

## 2021-03-15 PROCEDURE — 73721 MRI JNT OF LWR EXTRE W/O DYE: CPT

## 2021-03-17 ENCOUNTER — OFFICE VISIT (OUTPATIENT)
Dept: OBGYN CLINIC | Facility: CLINIC | Age: 51
End: 2021-03-17
Payer: COMMERCIAL

## 2021-03-17 ENCOUNTER — TELEPHONE (OUTPATIENT)
Dept: GASTROENTEROLOGY | Facility: CLINIC | Age: 51
End: 2021-03-17

## 2021-03-17 VITALS
DIASTOLIC BLOOD PRESSURE: 87 MMHG | HEIGHT: 74 IN | BODY MASS INDEX: 40.43 KG/M2 | SYSTOLIC BLOOD PRESSURE: 143 MMHG | WEIGHT: 315 LBS | HEART RATE: 60 BPM

## 2021-03-17 DIAGNOSIS — M17.12 PRIMARY OSTEOARTHRITIS OF LEFT KNEE: Primary | ICD-10-CM

## 2021-03-17 PROCEDURE — 99213 OFFICE O/P EST LOW 20 MIN: CPT | Performed by: FAMILY MEDICINE

## 2021-03-17 PROCEDURE — 1036F TOBACCO NON-USER: CPT | Performed by: FAMILY MEDICINE

## 2021-03-17 PROCEDURE — 3077F SYST BP >= 140 MM HG: CPT | Performed by: FAMILY MEDICINE

## 2021-03-17 PROCEDURE — 3008F BODY MASS INDEX DOCD: CPT | Performed by: FAMILY MEDICINE

## 2021-03-17 PROCEDURE — 3079F DIAST BP 80-89 MM HG: CPT | Performed by: FAMILY MEDICINE

## 2021-03-17 NOTE — TELEPHONE ENCOUNTER
Dr Jennifer Daigle patient - having severe discomfort and pain and feels like he has a ambreen-rectal abscess, starting last week  Size has gotten very large and pain has increased  Please return call to [phone] as soon as possible  Attempted to schedule appt with one of the PA's and either the times were not good or next week was too far  Thxs

## 2021-03-17 NOTE — TELEPHONE ENCOUNTER
Agreed, if the patient were to have any fever he should be seen in the emergency room  Thank you, Heather Echavarria!

## 2021-03-17 NOTE — TELEPHONE ENCOUNTER
DAHLIA: Spoke with patients wife  History of GERD with barretts esophagus  Patient has not been seen in an OV only a COLON/EGD in March of 2020  Patient c/o lump near his rectum which he feels is another abscess like he had 6+ years ago  Offered an appointment today, he will be unable to make it  Next available appointment is next Wednesday  They do not want to wait  She understands patient should be seen to evaluate the area  Suggested patient go to urgent care or follow-up with colorectal surgeon whom did surgery on his last abscess

## 2021-03-17 NOTE — PROGRESS NOTES
Assessment/Plan:  Assessment/Plan   Diagnoses and all orders for this visit:    Primary osteoarthritis of left knee  -     Injection procedure prior authorization; Future  -     Medial  Knee Brace        54-year-old male with osteoarthritis of left knee with left knee pain of more than 10 years duration  Discussed with patient MRI results, impression and plan  MRI of the left knee is noted for extensive full-thickness cartilage loss weight-bearing aspect medial femoral condyle with reactive subchondral marrow edema and full-thickness cartilage fissuring at the medial range, and full-thickness mid trochlear cartilage loss  Clinical impression that he is symptomatic from degenerative changes in the knee  I discussed treatment option of viscosupplementation to which he agreed  We will request for one-shot Visco supplement injection  He will return for injection once approved  In the interim I will refer him for medial  brace  Subjective:   Patient ID: Abelino Franks is a 45 y o  male  Chief Complaint   Patient presents with    Left Knee - Follow-up, Pain, Swelling       54-year-old active male with osteoarthritis of the left knee with left knee pain more than 10 years duration  He was last seen by me 6 days ago at which point he was referred for MRI of the left knee  He has been experiencing pain that is generalized to the knee but worse at the medial aspect, achy and throbbing and sometimes sharp, nonradiating, worse with bearing weight and ambulating, associated swelling, and improved with resting  He has been alternating between ibuprofen 800 mg and Percocet for pain  Knee Pain  This is a chronic problem  The current episode started more than 1 year ago  The problem occurs daily  The problem has been gradually worsening  Associated symptoms include arthralgias and joint swelling  Pertinent negatives include no numbness or weakness   The symptoms are aggravated by standing and walking  He has tried rest, NSAIDs, oral narcotics, ice, acetaminophen and position changes (Corticosteroid injection) for the symptoms  The treatment provided mild relief  Review of Systems   Musculoskeletal: Positive for arthralgias and joint swelling  Neurological: Negative for weakness and numbness  Objective:  Vitals:    03/17/21 1350   BP: 143/87   Pulse: 60   Weight: (!) 150 kg (330 lb)   Height: 6' 2" (1 88 m)     Ortho Exam    Physical Exam  Vitals signs and nursing note reviewed  Constitutional:       General: He is not in acute distress  Appearance: He is well-developed  HENT:      Head: Normocephalic and atraumatic  Right Ear: External ear normal       Left Ear: External ear normal    Eyes:      Conjunctiva/sclera: Conjunctivae normal    Neck:      Trachea: No tracheal deviation  Cardiovascular:      Rate and Rhythm: Normal rate  Pulmonary:      Effort: Pulmonary effort is normal  No respiratory distress  Abdominal:      General: There is no distension  Skin:     General: Skin is warm and dry  Neurological:      Mental Status: He is alert and oriented to person, place, and time  Psychiatric:         Behavior: Behavior normal          I have personally reviewed pertinent films in PACS and my interpretation is Significant osteoarthritis medial joint  with medial femoral condyle reactive marrow edema

## 2021-03-18 PROBLEM — K61.0 PERIANAL ABSCESS: Status: ACTIVE | Noted: 2021-03-18

## 2021-03-26 ENCOUNTER — TELEPHONE (OUTPATIENT)
Dept: OBGYN CLINIC | Facility: CLINIC | Age: 51
End: 2021-03-26

## 2021-04-02 ENCOUNTER — OFFICE VISIT (OUTPATIENT)
Dept: OBGYN CLINIC | Facility: CLINIC | Age: 51
End: 2021-04-02
Payer: COMMERCIAL

## 2021-04-02 VITALS
DIASTOLIC BLOOD PRESSURE: 80 MMHG | BODY MASS INDEX: 40.43 KG/M2 | HEART RATE: 62 BPM | HEIGHT: 74 IN | SYSTOLIC BLOOD PRESSURE: 146 MMHG | WEIGHT: 315 LBS

## 2021-04-02 DIAGNOSIS — M17.12 PRIMARY OSTEOARTHRITIS OF LEFT KNEE: Primary | ICD-10-CM

## 2021-04-02 PROCEDURE — 3079F DIAST BP 80-89 MM HG: CPT | Performed by: FAMILY MEDICINE

## 2021-04-02 PROCEDURE — 99213 OFFICE O/P EST LOW 20 MIN: CPT | Performed by: FAMILY MEDICINE

## 2021-04-02 PROCEDURE — 3008F BODY MASS INDEX DOCD: CPT | Performed by: FAMILY MEDICINE

## 2021-04-02 PROCEDURE — 20610 DRAIN/INJ JOINT/BURSA W/O US: CPT | Performed by: FAMILY MEDICINE

## 2021-04-02 PROCEDURE — 1036F TOBACCO NON-USER: CPT | Performed by: FAMILY MEDICINE

## 2021-04-02 PROCEDURE — 3077F SYST BP >= 140 MM HG: CPT | Performed by: FAMILY MEDICINE

## 2021-04-02 RX ORDER — LIDOCAINE HYDROCHLORIDE 10 MG/ML
3 INJECTION, SOLUTION INFILTRATION; PERINEURAL
Status: COMPLETED | OUTPATIENT
Start: 2021-04-02 | End: 2021-04-02

## 2021-04-02 RX ADMIN — LIDOCAINE HYDROCHLORIDE 3 ML: 10 INJECTION, SOLUTION INFILTRATION; PERINEURAL at 12:20

## 2021-04-02 NOTE — PROGRESS NOTES
Assessment/Plan:  Assessment/Plan   Diagnoses and all orders for this visit:    Primary osteoarthritis of left knee  -     Large joint arthrocentesis: L knee        76-year-old male with osteoarthritis of left knee with left knee pain of more than 10 years duration  Clinical impression that he is symptomatic from degeneration of the knee joint  He agreed to proceed with viscosupplementation  I administered Monovisc injection to left knee without complication  He was advised to allow 3-4 weeks before he may notice full effects of Visco supplementation  If Visco supplement injection is to be repeated we must wait at least 6 months  He was provided with medial  knee brace  He will follow up as needed  Subjective:   Patient ID: Sara Barros is a 45 y o  male  Chief Complaint   Patient presents with    Left Knee - Follow-up, Pain       76-year-old male with osteoarthritis of left knee with left knee pain of more than 10 years duration  He was last seen by me 2 weeks ago which point MRI reviewed with him was noted for extensive full-thickness cartilage loss weight-bearing aspect of the medial femoral condyle  We submitted request for viscosupplementation  He presents today for Visco supplement injection  He has been experiencing pain described as localized to the medial aspect knee, achy and throbbing and sometimes sharp, non radiating, worse with standing and ambulation, associated swelling, and improved with rest   He has been taking ibuprofen 800 mg intermittently to help with pain  Knee Pain  This is a chronic problem  The current episode started more than 1 year ago  The problem occurs daily  The problem has been waxing and waning  Associated symptoms include arthralgias and joint swelling  Pertinent negatives include no numbness or weakness  The symptoms are aggravated by standing and walking   He has tried rest, NSAIDs, oral narcotics and ice (Corticosteroid injection) for the symptoms  The treatment provided mild relief  Review of Systems   Musculoskeletal: Positive for arthralgias and joint swelling  Neurological: Negative for weakness and numbness  Objective:  Vitals:    04/02/21 1139   BP: 146/80   Pulse: 62   Weight: (!) 152 kg (335 lb)   Height: 6' 2" (1 88 m)     Left Knee Exam     Muscle Strength   The patient has normal left knee strength  Tenderness   The patient is experiencing tenderness in the medial joint line  Range of Motion   Extension: normal     Other   Swelling: mild            Physical Exam  Vitals signs and nursing note reviewed  Constitutional:       General: He is not in acute distress  Appearance: He is well-developed  HENT:      Head: Normocephalic and atraumatic  Right Ear: External ear normal       Left Ear: External ear normal    Eyes:      Conjunctiva/sclera: Conjunctivae normal    Neck:      Trachea: No tracheal deviation  Cardiovascular:      Rate and Rhythm: Normal rate  Pulmonary:      Effort: Pulmonary effort is normal  No respiratory distress  Abdominal:      General: There is no distension  Musculoskeletal:         General: Swelling and tenderness present  Skin:     General: Skin is warm and dry  Neurological:      Mental Status: He is alert and oriented to person, place, and time  Psychiatric:         Behavior: Behavior normal          Large joint arthrocentesis: L knee  Universal Protocol:  Consent: Verbal consent obtained  Risks and benefits: risks, benefits and alternatives were discussed  Consent given by: patient  Time out: Immediately prior to procedure a "time out" was called to verify the correct patient, procedure, equipment, support staff and site/side marked as required    Timeout called at: 4/2/2021 12:05 PM   Patient understanding: patient states understanding of the procedure being performed  Patient consent: the patient's understanding of the procedure matches consent given  Procedure consent: procedure consent matches procedure scheduled  Relevant documents: relevant documents present and verified  Test results: test results available and properly labeled  Site marked: the operative site was marked  Radiology Images displayed and confirmed   If images not available, report reviewed: imaging studies available  Required items: required blood products, implants, devices, and special equipment available  Patient identity confirmed: verbally with patient    Supporting Documentation  Indications: pain and joint swelling   Procedure Details  Location: knee - L knee  Preparation: Patient was prepped and draped in the usual sterile fashion  Needle gauge: 21 G 1 5"  Ultrasound guidance: no  Approach: anteromedial  Medications administered: 3 mL lidocaine 1 %; 88 mg hyaluronan 88 MG/4ML  Specialty Pharmacy Supplied: received medications from pharmacy  Patient tolerance: patient tolerated the procedure well with no immediate complications  Dressing:  Sterile dressing applied

## 2021-04-08 DIAGNOSIS — K21.9 GASTROESOPHAGEAL REFLUX DISEASE WITHOUT ESOPHAGITIS: ICD-10-CM

## 2021-04-09 RX ORDER — PANTOPRAZOLE SODIUM 40 MG/1
40 TABLET, DELAYED RELEASE ORAL DAILY
Qty: 90 TABLET | Refills: 3 | Status: SHIPPED | OUTPATIENT
Start: 2021-04-09 | End: 2022-01-27 | Stop reason: SDUPTHER

## 2021-07-08 RX ORDER — DICLOFENAC SODIUM 75 MG/1
75 TABLET, DELAYED RELEASE ORAL 2 TIMES DAILY
Qty: 60 TABLET | Refills: 1 | OUTPATIENT
Start: 2021-07-08

## 2021-07-15 LAB
ALBUMIN SERPL-MCNC: 4.7 G/DL (ref 3.8–4.9)
ALBUMIN/CREAT UR: 5 MG/G CREAT (ref 0–29)
ALBUMIN/GLOB SERPL: 2.4 {RATIO} (ref 1.2–2.2)
ALP SERPL-CCNC: 68 IU/L (ref 48–121)
ALT SERPL-CCNC: 66 IU/L (ref 0–44)
APPEARANCE UR: CLEAR
AST SERPL-CCNC: 40 IU/L (ref 0–40)
BACTERIA URNS QL MICRO: NORMAL
BILIRUB SERPL-MCNC: 0.7 MG/DL (ref 0–1.2)
BILIRUB UR QL STRIP: NEGATIVE
BUN SERPL-MCNC: 15 MG/DL (ref 6–24)
BUN/CREAT SERPL: 16 (ref 9–20)
CALCIUM SERPL-MCNC: 9.4 MG/DL (ref 8.7–10.2)
CASTS URNS QL MICRO: NORMAL /LPF
CHLORIDE SERPL-SCNC: 105 MMOL/L (ref 96–106)
CO2 SERPL-SCNC: 23 MMOL/L (ref 20–29)
COLOR UR: YELLOW
CREAT SERPL-MCNC: 0.92 MG/DL (ref 0.76–1.27)
CREAT UR-MCNC: 214 MG/DL
EPI CELLS #/AREA URNS HPF: NORMAL /HPF (ref 0–10)
FERRITIN SERPL-MCNC: 439 NG/ML (ref 30–400)
GGT SERPL-CCNC: 91 IU/L (ref 0–65)
GLOBULIN SER-MCNC: 2 G/DL (ref 1.5–4.5)
GLUCOSE SERPL-MCNC: 133 MG/DL (ref 65–99)
GLUCOSE UR QL: NEGATIVE
HBA1C MFR BLD: 5.7 % (ref 4.8–5.6)
HGB UR QL STRIP: NEGATIVE
IRON SATN MFR SERPL: 26 % (ref 15–55)
IRON SERPL-MCNC: 80 UG/DL (ref 38–169)
KETONES UR QL STRIP: NEGATIVE
LEUKOCYTE ESTERASE UR QL STRIP: NEGATIVE
MICRO URNS: NORMAL
MICRO URNS: NORMAL
MICROALBUMIN UR-MCNC: 10.9 UG/ML
NITRITE UR QL STRIP: NEGATIVE
PH UR STRIP: 6 [PH] (ref 5–7.5)
POTASSIUM SERPL-SCNC: 4.2 MMOL/L (ref 3.5–5.2)
PROT SERPL-MCNC: 6.7 G/DL (ref 6–8.5)
PROT UR QL STRIP: NEGATIVE
RBC #/AREA URNS HPF: NORMAL /HPF (ref 0–2)
SL AMB EGFR AFRICAN AMERICAN: 110 ML/MIN/1.73
SL AMB EGFR NON AFRICAN AMERICAN: 95 ML/MIN/1.73
SODIUM SERPL-SCNC: 141 MMOL/L (ref 134–144)
SP GR UR: 1.02 (ref 1–1.03)
TIBC SERPL-MCNC: 303 UG/DL (ref 250–450)
TRANSFERRIN SERPL-MCNC: 257 MG/DL (ref 177–329)
UIBC SERPL-MCNC: 223 UG/DL (ref 111–343)
UROBILINOGEN UR STRIP-ACNC: 0.2 MG/DL (ref 0.2–1)
WBC #/AREA URNS HPF: NORMAL /HPF (ref 0–5)

## 2021-07-16 ENCOUNTER — OFFICE VISIT (OUTPATIENT)
Dept: FAMILY MEDICINE CLINIC | Facility: MEDICAL CENTER | Age: 51
End: 2021-07-16
Payer: COMMERCIAL

## 2021-07-16 VITALS
WEIGHT: 315 LBS | DIASTOLIC BLOOD PRESSURE: 86 MMHG | HEART RATE: 82 BPM | BODY MASS INDEX: 40.43 KG/M2 | SYSTOLIC BLOOD PRESSURE: 132 MMHG | HEIGHT: 74 IN | TEMPERATURE: 99 F | OXYGEN SATURATION: 96 %

## 2021-07-16 DIAGNOSIS — E66.01 MORBID OBESITY (HCC): Primary | ICD-10-CM

## 2021-07-16 DIAGNOSIS — Z85.79 PERSONAL HISTORY OF WALDENSTROM'S MACROGLOBULINEMIA: ICD-10-CM

## 2021-07-16 DIAGNOSIS — R74.01 TRANSAMINITIS: ICD-10-CM

## 2021-07-16 PROCEDURE — 99213 OFFICE O/P EST LOW 20 MIN: CPT | Performed by: FAMILY MEDICINE

## 2021-07-16 NOTE — PROGRESS NOTES
Assessment/Plan:       Diagnoses and all orders for this visit:    Morbid obesity ([facility] [address] )  -     Ambulatory referral to Weight Management; Future  Transaminitis  Liver enzymes have improved on recent labs  Liver enzyme elevation however likely due to obesity  I recommended referral to weight management for further evaluation and patient was in agreement  Continue with diet and exercise as well  BMI Counseling: Body mass index is 41 6 kg/m²  The BMI is above normal  Nutrition recommendations include decreasing overall calorie intake  Exercise recommendations include moderate aerobic physical activity for 150 minutes/week  Patient referred to weight management due to patient being morbidly obese  Personal history of Waldenstrom's macroglobulinemia  -     JAK2 EXON 12 MUTATION; Future  -     Calreticulin (CALR) Mutation; Future  I have reached out to Oncology and labs that can be performed to assess for Waldenstrom's macroglobulinemia include JAK2 mutation and CALR mutation  Patient is agreeable to having labs performed to assess for these mutations  Patient encouraged to go get his COVID-19 vaccine  Follow-up in about two months or sooner if needed  Subjective:      Patient ID: Jillian Julien is a 45 y o  male  Presents for follow-up  He has obesity  Due to this he does have elevated liver enzymes  He has been able to lose weight  He would be interested in getting help to lose weight  He did have labs recently to monitor his liver enzymes  He also tells me his father and grandfather have markers for Waldenstrom's macroglobulinemia  The following portions of the patient's history were reviewed and updated as appropriate: He  has a past medical history of Basal cell carcinoma, CPAP (continuous positive airway pressure) dependence, Essential hypertension (5/23/2018), GERD (gastroesophageal reflux disease), and Sleep apnea    He   Patient Active Problem List    Diagnosis Date Noted    Personal history of Waldenstrom's macroglobulinemia 07/16/2021    Perianal abscess 03/18/2021    Gastroesophageal reflux disease 09/20/2018    Acid reflux 03/31/2017    Morbid obesity ([address] ) 03/31/2017    Enlarged liver 03/21/2017    Fatty liver 03/21/2017    Hyperglycemia 02/14/2017    Transaminitis 02/14/2017    KRISTEN on CPAP 02/09/2017     He  has a past surgical history that includes Skin cancer excision; Abscess drainage; and pr esophagogastroduodenoscopy transoral diagnostic (N/A, 10/13/2018)  His family history includes Anxiety disorder in his mother; Cancer in his mother; Coronary artery disease in his father; Depression in his mother; Hyperlipidemia in his father and mother; Hypertension in his father and mother; Prostate cancer in his paternal grandfather and paternal uncle; Skin cancer in his mother  He  reports that he quit smoking about 8 years ago  His smoking use included cigarettes  He has never used smokeless tobacco  He reports current alcohol use  He reports that he does not use drugs  Current Outpatient Medications   Medication Sig Dispense Refill    Misc  Devices MISC Automatic blood pressure cuff to use daily as directed  1 each 0    pantoprazole (PROTONIX) 40 mg tablet TAKE 1 TABLET (40 MG TOTAL) BY MOUTH DAILY 90 tablet 3     No current facility-administered medications for this visit  Current Outpatient Medications on File Prior to Visit   Medication Sig    Misc  Devices MISC Automatic blood pressure cuff to use daily as directed      pantoprazole (PROTONIX) 40 mg tablet TAKE 1 TABLET (40 MG TOTAL) BY MOUTH DAILY    [DISCONTINUED] diclofenac (VOLTAREN) 75 mg EC tablet Take 1 tablet (75 mg total) by mouth 2 (two) times a day (Patient not taking: Reported on 3/18/2021)    [DISCONTINUED] ibuprofen (MOTRIN) 800 mg tablet Take 1 tablet (800 mg total) by mouth 3 (three) times a day as needed for mild pain    [DISCONTINUED] oxyCODONE-acetaminophen (PERCOCET) 5-325 mg per tablet Take 1 tablet by mouth 2 (two) times a day as needed for moderate painMax Daily Amount: 2 tablets (Patient not taking: Reported on 4/2/2021)     No current facility-administered medications on file prior to visit  He is allergic to ciprofloxacin       Review of Systems   Constitutional: Negative for fever  Respiratory: Negative for shortness of breath  Cardiovascular: Negative for chest pain  Objective:      /86 (BP Location: Left arm, Patient Position: Sitting, Cuff Size: Large)   Pulse 82   Temp 99 °F (37 2 °C)   Ht 6' 2" (1 88 m)   Wt (!) 147 kg (324 lb)   SpO2 96%   BMI 41 60 kg/m²          Physical Exam  Constitutional:       General: He is not in acute distress  Appearance: He is obese  He is not ill-appearing  Cardiovascular:      Rate and Rhythm: Normal rate and regular rhythm  Heart sounds: Normal heart sounds  Pulmonary:      Effort: Pulmonary effort is normal       Breath sounds: Normal breath sounds

## 2021-08-04 ENCOUNTER — TELEPHONE (OUTPATIENT)
Dept: FAMILY MEDICINE CLINIC | Facility: MEDICAL CENTER | Age: 51
End: 2021-08-04

## 2021-08-04 NOTE — TELEPHONE ENCOUNTER
Pt said that when he saw you last Friday he forgot to discuss something with you  Pt wanted to discuss getting a med for ED  Pt has had the problem on and off for a year  He says "he needs a little help every now and then"  Pt uses the [facility]

## 2021-08-05 NOTE — TELEPHONE ENCOUNTER
Pt returned call and was upset that he needs to make another appt, he feels it's unnecessary, he said, "I was just there"  I tried to reiterate your need for pt to have appt, and he said, "that's ridiculous, he's got my bw, what more could he need "  I asked it he wanted to wait until appt he already has scheduled in Sept?  Pt asked if he could speak with you I explained you were gone for the day, he still asked if you could call him or do a video visit, he can't miss work  I said I'd pass along the msg for you to call him  Make sure we call pt's [facility],[phone] he was upset we called pt's wife

## 2021-08-05 NOTE — TELEPHONE ENCOUNTER
Spoke with pt's wife (she is on his communication consent )  She will relay Dr Stacy Resendiz message to pt    If he would like the Rx sooner than next month, he will call back to set up an appt

## 2021-08-10 ENCOUNTER — OFFICE VISIT (OUTPATIENT)
Dept: FAMILY MEDICINE CLINIC | Facility: MEDICAL CENTER | Age: 51
End: 2021-08-10
Payer: COMMERCIAL

## 2021-08-10 VITALS
WEIGHT: 315 LBS | HEART RATE: 77 BPM | HEIGHT: 74 IN | TEMPERATURE: 98.6 F | DIASTOLIC BLOOD PRESSURE: 90 MMHG | BODY MASS INDEX: 40.43 KG/M2 | OXYGEN SATURATION: 97 % | SYSTOLIC BLOOD PRESSURE: 136 MMHG

## 2021-08-10 DIAGNOSIS — N52.9 ERECTILE DYSFUNCTION, UNSPECIFIED ERECTILE DYSFUNCTION TYPE: Primary | ICD-10-CM

## 2021-08-10 PROCEDURE — 1036F TOBACCO NON-USER: CPT | Performed by: FAMILY MEDICINE

## 2021-08-10 PROCEDURE — 3080F DIAST BP >= 90 MM HG: CPT | Performed by: FAMILY MEDICINE

## 2021-08-10 PROCEDURE — 3075F SYST BP GE 130 - 139MM HG: CPT | Performed by: FAMILY MEDICINE

## 2021-08-10 PROCEDURE — 99213 OFFICE O/P EST LOW 20 MIN: CPT | Performed by: FAMILY MEDICINE

## 2021-08-10 PROCEDURE — 3008F BODY MASS INDEX DOCD: CPT | Performed by: FAMILY MEDICINE

## 2021-08-10 RX ORDER — SILDENAFIL 25 MG/1
TABLET, FILM COATED ORAL
Qty: 30 TABLET | Refills: 1 | Status: SHIPPED | OUTPATIENT
Start: 2021-08-10 | End: 2021-10-11

## 2021-08-10 NOTE — PROGRESS NOTES
Assessment/Plan:       Diagnoses and all orders for this visit:    Erectile dysfunction, unspecified erectile dysfunction type  -     sildenafil (VIAGRA) 25 MG tablet; Take 1-4 tabs PO daily PRN ED  Take 30min - 1hr prior to sexual activity  For patient's erectile dysfunction I have recommended he try Viagra as needed  I did inform him that if he experiences shortness of breath or chest pain while using the medication he must go to the ER right away for evaluation  I did inform him as well that if he has an erection lasting longer than 4 hours he needs to go to the ER right away as well for immediate penile decompression  Patient knowledge understanding  I will start him on Viagra 25 mg daily as needed  He can increase the dose for effect to a maximum of four tablets daily as needed  Follow-up in September as previously scheduled or sooner if needed  Subjective:      Patient ID: Darrick Mohan is a 45 y o  male  Patient presents with a chief complaint of erectile dysfunction  This problem has persisted for about one year  Patient can get but cannot maintain an erection  He is unsure if it is a physical problem or a psychological problem  No shortness of breath or chest pain with sexual activity  Patient tells me he is able to masturbate and ejaculate with little difficulty  He tells me when he is intimate with his wife he will start to worry about not being able to maintain an erection and that is when he becomes flaccid  He has tried Viagra in the past although it was not his prescription  He did have good results from the medication  Did not have any side effects  The following portions of the patient's history were reviewed and updated as appropriate: He  has a past medical history of Basal cell carcinoma, CPAP (continuous positive airway pressure) dependence, Essential hypertension (5/23/2018), GERD (gastroesophageal reflux disease), and Sleep apnea    He   Patient Active Problem List    Diagnosis Date Noted    Erectile dysfunction 08/10/2021    Personal history of Waldenstrom's macroglobulinemia 07/16/2021    Perianal abscess 03/18/2021    Gastroesophageal reflux disease 09/20/2018    Acid reflux 03/31/2017    Morbid obesity ([facility] [address] ) 03/31/2017    Enlarged liver 03/21/2017    Fatty liver 03/21/2017    Hyperglycemia 02/14/2017    Transaminitis 02/14/2017    KRISTEN on CPAP 02/09/2017     He  has a past surgical history that includes Skin cancer excision; Abscess drainage; and pr esophagogastroduodenoscopy transoral diagnostic (N/A, 10/13/2018)  His family history includes Anxiety disorder in his mother; Cancer in his mother; Coronary artery disease in his father; Depression in his mother; Hyperlipidemia in his father and mother; Hypertension in his father and mother; Prostate cancer in his paternal grandfather and paternal uncle; Skin cancer in his mother  He  reports that he quit smoking about 8 years ago  His smoking use included cigarettes  He has never used smokeless tobacco  He reports current alcohol use  He reports that he does not use drugs  Current Outpatient Medications   Medication Sig Dispense Refill    Misc  Devices MISC Automatic blood pressure cuff to use daily as directed  1 each 0    pantoprazole (PROTONIX) 40 mg tablet TAKE 1 TABLET (40 MG TOTAL) BY MOUTH DAILY 90 tablet 3    sildenafil (VIAGRA) 25 MG tablet Take 1-4 tabs PO daily PRN ED  Take 30min - 1hr prior to sexual activity  30 tablet 1     No current facility-administered medications for this visit  Current Outpatient Medications on File Prior to Visit   Medication Sig    Misc  Devices MISC Automatic blood pressure cuff to use daily as directed   pantoprazole (PROTONIX) 40 mg tablet TAKE 1 TABLET (40 MG TOTAL) BY MOUTH DAILY     No current facility-administered medications on file prior to visit  He is allergic to ciprofloxacin       Review of Systems   Constitutional: Negative for fever  Respiratory: Negative for shortness of breath  Cardiovascular: Negative for chest pain  Objective:      /90 (BP Location: Left arm, Patient Position: Sitting, Cuff Size: Large)   Pulse 77   Temp 98 6 °F (37 °C)   Ht 6' 2" (1 88 m)   Wt (!) 143 kg (316 lb)   SpO2 97%   BMI 40 57 kg/m²          Physical Exam  Constitutional:       General: He is not in acute distress  Appearance: He is obese  He is not ill-appearing  Pulmonary:      Effort: No respiratory distress  Genitourinary:     Penis: Normal and circumcised      Psychiatric:         Mood and Affect: Mood normal

## 2021-08-11 ENCOUNTER — TELEPHONE (OUTPATIENT)
Dept: GASTROENTEROLOGY | Facility: CLINIC | Age: 51
End: 2021-08-11

## 2021-08-12 NOTE — TELEPHONE ENCOUNTER
Spoke with patients wife and advised that he is not due yearly for an EGD  He was negative on the last biopsy, so he does not need to repeat the EGD unless he is having issues for which he would need to see the physician to discuss and determine if further procedures are necessary

## 2021-09-09 ENCOUNTER — TELEPHONE (OUTPATIENT)
Dept: OBGYN CLINIC | Facility: HOSPITAL | Age: 51
End: 2021-09-09

## 2021-09-09 NOTE — TELEPHONE ENCOUNTER
Patient last had gel injections in April  He's requesting Dr Duran Ashby to order the new set of gel injections  He is asking what to do in the meantime before the 6 months is up  The pain in his left knee is back        Noemy Lot [phone]

## 2021-09-09 NOTE — TELEPHONE ENCOUNTER
Please advise patient that gel order can't be placed until after 10/02/2021  He may come in sooner to have cortisone injection      Thanks

## 2021-09-28 NOTE — TELEPHONE ENCOUNTER
Patient sees Dr Suzanne Tapia  Patient calling about his knee pain of a 25, he did schedule an appt for 10/14 for cortisone, but really wants the gel, if the gel is ordered and in before 10/14, he prefers the gel it works so much better for him       [phone]

## 2021-09-28 NOTE — TELEPHONE ENCOUNTER
His apt is after his due date for the gel shot, we could have the injections on time as long as the order is in      Thank you

## 2021-10-11 DIAGNOSIS — N52.9 ERECTILE DYSFUNCTION, UNSPECIFIED ERECTILE DYSFUNCTION TYPE: ICD-10-CM

## 2021-10-11 RX ORDER — SILDENAFIL 25 MG/1
TABLET, FILM COATED ORAL
Qty: 30 TABLET | Refills: 1 | Status: SHIPPED | OUTPATIENT
Start: 2021-10-11 | End: 2022-02-21 | Stop reason: SDUPTHER

## 2021-10-12 DIAGNOSIS — M17.12 PRIMARY OSTEOARTHRITIS OF LEFT KNEE: Primary | ICD-10-CM

## 2021-10-13 ENCOUNTER — TELEPHONE (OUTPATIENT)
Dept: OBGYN CLINIC | Facility: HOSPITAL | Age: 51
End: 2021-10-13

## 2021-11-23 ENCOUNTER — OFFICE VISIT (OUTPATIENT)
Dept: FAMILY MEDICINE CLINIC | Facility: MEDICAL CENTER | Age: 51
End: 2021-11-23
Payer: COMMERCIAL

## 2021-11-23 VITALS
TEMPERATURE: 98.3 F | RESPIRATION RATE: 16 BRPM | HEIGHT: 74 IN | BODY MASS INDEX: 38.37 KG/M2 | SYSTOLIC BLOOD PRESSURE: 150 MMHG | HEART RATE: 100 BPM | WEIGHT: 299 LBS | DIASTOLIC BLOOD PRESSURE: 90 MMHG

## 2021-11-23 DIAGNOSIS — Z13.220 SCREENING FOR LIPID DISORDERS: ICD-10-CM

## 2021-11-23 DIAGNOSIS — Z13.1 SCREENING FOR DIABETES MELLITUS: ICD-10-CM

## 2021-11-23 DIAGNOSIS — Z85.79 PERSONAL HISTORY OF WALDENSTROM'S MACROGLOBULINEMIA: ICD-10-CM

## 2021-11-23 DIAGNOSIS — N52.9 ERECTILE DYSFUNCTION, UNSPECIFIED ERECTILE DYSFUNCTION TYPE: Primary | ICD-10-CM

## 2021-11-23 DIAGNOSIS — Z00.00 PHYSICAL EXAM, ANNUAL: ICD-10-CM

## 2021-11-23 DIAGNOSIS — Z12.5 SCREENING FOR PROSTATE CANCER: ICD-10-CM

## 2021-11-23 DIAGNOSIS — R03.0 ELEVATED BP WITHOUT DIAGNOSIS OF HYPERTENSION: ICD-10-CM

## 2021-11-23 DIAGNOSIS — R74.01 TRANSAMINITIS: ICD-10-CM

## 2021-11-23 DIAGNOSIS — R73.9 HYPERGLYCEMIA: ICD-10-CM

## 2021-11-23 PROBLEM — M75.110 PARTIAL THICKNESS ROTATOR CUFF TEAR: Status: ACTIVE | Noted: 2021-11-23

## 2021-11-23 PROCEDURE — 99396 PREV VISIT EST AGE 40-64: CPT | Performed by: FAMILY MEDICINE

## 2021-11-23 PROCEDURE — 1036F TOBACCO NON-USER: CPT | Performed by: FAMILY MEDICINE

## 2021-11-23 PROCEDURE — 3008F BODY MASS INDEX DOCD: CPT | Performed by: FAMILY MEDICINE

## 2021-11-23 PROCEDURE — 3725F SCREEN DEPRESSION PERFORMED: CPT | Performed by: FAMILY MEDICINE

## 2021-11-23 PROCEDURE — 99213 OFFICE O/P EST LOW 20 MIN: CPT | Performed by: FAMILY MEDICINE

## 2021-11-23 RX ORDER — TADALAFIL 5 MG/1
TABLET ORAL
Qty: 90 TABLET | Refills: 0 | Status: SHIPPED | OUTPATIENT
Start: 2021-11-23 | End: 2022-02-22

## 2021-12-03 ENCOUNTER — OFFICE VISIT (OUTPATIENT)
Dept: URGENT CARE | Facility: CLINIC | Age: 51
End: 2021-12-03
Payer: COMMERCIAL

## 2021-12-03 VITALS
OXYGEN SATURATION: 96 % | TEMPERATURE: 98.4 F | WEIGHT: 299 LBS | RESPIRATION RATE: 18 BRPM | HEIGHT: 74 IN | BODY MASS INDEX: 38.37 KG/M2 | HEART RATE: 96 BPM

## 2021-12-03 DIAGNOSIS — K04.7 DENTAL ABSCESS: Primary | ICD-10-CM

## 2021-12-03 PROCEDURE — 99213 OFFICE O/P EST LOW 20 MIN: CPT | Performed by: PHYSICIAN ASSISTANT

## 2021-12-03 RX ORDER — AMOXICILLIN 500 MG/1
500 CAPSULE ORAL EVERY 8 HOURS SCHEDULED
Qty: 22 CAPSULE | Refills: 0 | Status: SHIPPED | OUTPATIENT
Start: 2021-12-03 | End: 2021-12-10

## 2021-12-21 ENCOUNTER — CONSULT (OUTPATIENT)
Dept: PULMONOLOGY | Facility: CLINIC | Age: 51
End: 2021-12-21
Payer: COMMERCIAL

## 2021-12-21 VITALS
BODY MASS INDEX: 39.53 KG/M2 | WEIGHT: 308 LBS | DIASTOLIC BLOOD PRESSURE: 84 MMHG | OXYGEN SATURATION: 95 % | HEIGHT: 74 IN | TEMPERATURE: 97.5 F | HEART RATE: 92 BPM | SYSTOLIC BLOOD PRESSURE: 140 MMHG

## 2021-12-21 DIAGNOSIS — F51.12 INSUFFICIENT SLEEP SYNDROME: ICD-10-CM

## 2021-12-21 DIAGNOSIS — G47.33 OSA ON CPAP: Primary | ICD-10-CM

## 2021-12-21 DIAGNOSIS — E66.9 OBESITY (BMI 30-39.9): ICD-10-CM

## 2021-12-21 DIAGNOSIS — Z99.89 OSA ON CPAP: Primary | ICD-10-CM

## 2021-12-21 DIAGNOSIS — G47.19 EXCESSIVE DAYTIME SLEEPINESS: ICD-10-CM

## 2021-12-21 PROCEDURE — 1036F TOBACCO NON-USER: CPT | Performed by: INTERNAL MEDICINE

## 2021-12-21 PROCEDURE — 3008F BODY MASS INDEX DOCD: CPT | Performed by: INTERNAL MEDICINE

## 2021-12-21 PROCEDURE — 99204 OFFICE O/P NEW MOD 45 MIN: CPT | Performed by: INTERNAL MEDICINE

## 2022-01-12 ENCOUNTER — TELEPHONE (OUTPATIENT)
Dept: FAMILY MEDICINE CLINIC | Facility: MEDICAL CENTER | Age: 52
End: 2022-01-12

## 2022-01-12 NOTE — TELEPHONE ENCOUNTER
Pt started symptoms yesterday  He is having mild symptoms now  He is obese, not vaccinated  Wife, Octavio Corbettjacqueline is inquiring about MAB  She is on his consent  He tested positive at home  Please, call Katharine to advise

## 2022-01-15 ENCOUNTER — OFFICE VISIT (OUTPATIENT)
Dept: OBGYN CLINIC | Facility: CLINIC | Age: 52
End: 2022-01-15
Payer: COMMERCIAL

## 2022-01-15 VITALS
WEIGHT: 305 LBS | DIASTOLIC BLOOD PRESSURE: 93 MMHG | SYSTOLIC BLOOD PRESSURE: 159 MMHG | HEIGHT: 74 IN | BODY MASS INDEX: 39.14 KG/M2 | HEART RATE: 76 BPM

## 2022-01-15 DIAGNOSIS — M17.12 PRIMARY OSTEOARTHRITIS OF LEFT KNEE: Primary | ICD-10-CM

## 2022-01-15 PROCEDURE — 20610 DRAIN/INJ JOINT/BURSA W/O US: CPT | Performed by: FAMILY MEDICINE

## 2022-01-15 PROCEDURE — 3008F BODY MASS INDEX DOCD: CPT | Performed by: FAMILY MEDICINE

## 2022-01-15 PROCEDURE — 99213 OFFICE O/P EST LOW 20 MIN: CPT | Performed by: FAMILY MEDICINE

## 2022-01-15 PROCEDURE — 1036F TOBACCO NON-USER: CPT | Performed by: FAMILY MEDICINE

## 2022-01-15 RX ORDER — BUPIVACAINE HYDROCHLORIDE 2.5 MG/ML
2 INJECTION, SOLUTION INFILTRATION; PERINEURAL
Status: COMPLETED | OUTPATIENT
Start: 2022-01-15 | End: 2022-01-15

## 2022-01-15 RX ORDER — TRIAMCINOLONE ACETONIDE 40 MG/ML
20 INJECTION, SUSPENSION INTRA-ARTICULAR; INTRAMUSCULAR
Status: COMPLETED | OUTPATIENT
Start: 2022-01-15 | End: 2022-01-15

## 2022-01-15 RX ORDER — TRIAMCINOLONE ACETONIDE 40 MG/ML
40 INJECTION, SUSPENSION INTRA-ARTICULAR; INTRAMUSCULAR
Status: COMPLETED | OUTPATIENT
Start: 2022-01-15 | End: 2022-01-15

## 2022-01-15 RX ORDER — LIDOCAINE HYDROCHLORIDE 10 MG/ML
2 INJECTION, SOLUTION INFILTRATION; PERINEURAL
Status: COMPLETED | OUTPATIENT
Start: 2022-01-15 | End: 2022-01-15

## 2022-01-15 RX ORDER — LIDOCAINE HYDROCHLORIDE 10 MG/ML
3 INJECTION, SOLUTION INFILTRATION; PERINEURAL
Status: COMPLETED | OUTPATIENT
Start: 2022-01-15 | End: 2022-01-15

## 2022-01-15 RX ADMIN — LIDOCAINE HYDROCHLORIDE 2 ML: 10 INJECTION, SOLUTION INFILTRATION; PERINEURAL at 10:04

## 2022-01-15 RX ADMIN — BUPIVACAINE HYDROCHLORIDE 2 ML: 2.5 INJECTION, SOLUTION INFILTRATION; PERINEURAL at 10:04

## 2022-01-15 RX ADMIN — TRIAMCINOLONE ACETONIDE 20 MG: 40 INJECTION, SUSPENSION INTRA-ARTICULAR; INTRAMUSCULAR at 10:04

## 2022-01-15 RX ADMIN — TRIAMCINOLONE ACETONIDE 40 MG: 40 INJECTION, SUSPENSION INTRA-ARTICULAR; INTRAMUSCULAR at 10:04

## 2022-01-15 RX ADMIN — LIDOCAINE HYDROCHLORIDE 3 ML: 10 INJECTION, SOLUTION INFILTRATION; PERINEURAL at 10:04

## 2022-01-15 NOTE — PROGRESS NOTES
Assessment/Plan:  Assessment/Plan   Diagnoses and all orders for this visit:    Primary osteoarthritis of left knee  -     Large joint arthrocentesis: L knee  -     Ambulatory Referral to Pain Management; Future  -     Injection Procedure Prior Authorization; Future        62-year-old male with osteoarthritis of the left knee with left knee pain more than 10 years duration  Discussed with patient physical exam, impression and plan  Physical exam of the left knee noted for mild swelling  He has tenderness medial joint line  Clinical impression is that he is symptomatic from degenerative changes of the knee  I offered patient repeat viscosupplementation to which he agreed  We will request for 3 series Visco injection to left knee  I administered mixture of 2 cc 1% lidocaine, 2 cc 0 25% bupivacaine, and 1 5 cc Kenalog to left knee without complication  He will return for Visco injection once approved  I will also refer him to pain management for evaluation and consideration of genicular nerve block  Subjective:   Patient ID: Amna Dailey is a 45 y o  male  Chief Complaint   Patient presents with    Left Knee - Follow-up, Pain       62-year-old male with osteoarthritis of left knee following up for left knee pain more than 10 years duration  He was last seen by me more than 9 months ago at which point he received Monovisc injection to left knee  He states that injection provided about 6-7 weeks of improved pain  Since that time pain has been bothersome  Pain described as localized mainly to the medial aspect of knee, achy and throbbing and sometimes sharp, nonradiating, worse with standing and ambulating, worse with twisting, associated with swelling, and improved resting  He states that he is able to be active and ambulatory, but at nighttime when he is laying down using on his side he has significant pain  He manages with sleeping with a pillow between his knees      Knee Pain  This is a chronic problem  The current episode started more than 1 year ago  The problem occurs daily  The problem has been waxing and waning  Associated symptoms include arthralgias and joint swelling  Pertinent negatives include no numbness or weakness  The symptoms are aggravated by standing, walking, twisting and bending  He has tried rest, NSAIDs and position changes for the symptoms  The treatment provided mild relief  Review of Systems   Musculoskeletal: Positive for arthralgias and joint swelling  Neurological: Negative for weakness and numbness  Objective:  Vitals:    01/15/22 0929   BP: 159/93   Pulse: 76   Weight: (!) 138 kg (305 lb)   Height: 6' 2" (1 88 m)     Left Knee Exam     Tenderness   The patient is experiencing tenderness in the medial joint line  Other   Swelling: mild            Physical Exam  Vitals and nursing note reviewed  Constitutional:       General: He is not in acute distress  Appearance: He is well-developed  He is not ill-appearing or diaphoretic  HENT:      Head: Normocephalic and atraumatic  Right Ear: External ear normal       Left Ear: External ear normal    Eyes:      Conjunctiva/sclera: Conjunctivae normal    Neck:      Trachea: No tracheal deviation  Cardiovascular:      Rate and Rhythm: Normal rate  Pulmonary:      Effort: Pulmonary effort is normal  No respiratory distress  Abdominal:      General: There is no distension  Musculoskeletal:         General: Swelling and tenderness present  No deformity or signs of injury  Skin:     General: Skin is warm and dry  Coloration: Skin is not jaundiced or pale  Neurological:      Mental Status: He is alert and oriented to person, place, and time  Psychiatric:         Mood and Affect: Mood normal          Behavior: Behavior normal          Thought Content:  Thought content normal          Judgment: Judgment normal            Large joint arthrocentesis: L knee  Universal Protocol:  Consent: Verbal consent obtained  Risks and benefits: risks, benefits and alternatives were discussed  Consent given by: patient  Time out: Immediately prior to procedure a "time out" was called to verify the correct patient, procedure, equipment, support staff and site/side marked as required  Patient understanding: patient states understanding of the procedure being performed  Patient consent: the patient's understanding of the procedure matches consent given  Procedure consent: procedure consent matches procedure scheduled  Relevant documents: relevant documents present and verified  Test results: test results available and properly labeled  Site marked: the operative site was marked  Radiology Images displayed and confirmed   If images not available, report reviewed: imaging studies available  Required items: required blood products, implants, devices, and special equipment available  Patient identity confirmed: verbally with patient    Supporting Documentation  Indications: pain   Procedure Details  Location: knee - L knee  Preparation: Patient was prepped and draped in the usual sterile fashion  Needle gauge: 21G 2"  Ultrasound guidance: no  Approach: anteromedial  Medications administered: 2 mL bupivacaine 0 25 %; 2 mL lidocaine 1 %; 3 mL lidocaine 1 %; 40 mg triamcinolone acetonide 40 mg/mL; 20 mg triamcinolone acetonide 40 mg/mL    Patient tolerance: patient tolerated the procedure well with no immediate complications  Dressing:  Sterile dressing applied

## 2022-01-27 DIAGNOSIS — K21.9 GASTROESOPHAGEAL REFLUX DISEASE WITHOUT ESOPHAGITIS: ICD-10-CM

## 2022-01-27 RX ORDER — PANTOPRAZOLE SODIUM 40 MG/1
40 TABLET, DELAYED RELEASE ORAL DAILY
Qty: 90 TABLET | Refills: 3 | Status: SHIPPED | OUTPATIENT
Start: 2022-01-27 | End: 2022-06-20 | Stop reason: SDUPTHER

## 2022-01-27 NOTE — TELEPHONE ENCOUNTER
Received a request for renewal  of Pantoprazole from [facility]  will pend a order and send to [facility] med refills

## 2022-02-21 DIAGNOSIS — N52.9 ERECTILE DYSFUNCTION, UNSPECIFIED ERECTILE DYSFUNCTION TYPE: ICD-10-CM

## 2022-02-21 NOTE — TELEPHONE ENCOUNTER
Pt called to request Rx for viagra  He did not like the tadalafil    Please send to [facility]    Routed to Clinical

## 2022-02-22 ENCOUNTER — PROCEDURE VISIT (OUTPATIENT)
Dept: OBGYN CLINIC | Facility: CLINIC | Age: 52
End: 2022-02-22
Payer: COMMERCIAL

## 2022-02-22 VITALS
HEART RATE: 77 BPM | DIASTOLIC BLOOD PRESSURE: 99 MMHG | BODY MASS INDEX: 38.89 KG/M2 | HEIGHT: 74 IN | WEIGHT: 303 LBS | SYSTOLIC BLOOD PRESSURE: 164 MMHG

## 2022-02-22 DIAGNOSIS — M17.12 PRIMARY OSTEOARTHRITIS OF LEFT KNEE: Primary | ICD-10-CM

## 2022-02-22 PROCEDURE — 20610 DRAIN/INJ JOINT/BURSA W/O US: CPT | Performed by: FAMILY MEDICINE

## 2022-02-22 RX ORDER — BUPIVACAINE HYDROCHLORIDE 2.5 MG/ML
1 INJECTION, SOLUTION INFILTRATION; PERINEURAL
Status: COMPLETED | OUTPATIENT
Start: 2022-02-22 | End: 2022-02-22

## 2022-02-22 RX ORDER — HYALURONATE SODIUM 10 MG/ML
20 SYRINGE (ML) INTRAARTICULAR
Status: COMPLETED | OUTPATIENT
Start: 2022-02-22 | End: 2022-02-22

## 2022-02-22 RX ORDER — SILDENAFIL 25 MG/1
TABLET, FILM COATED ORAL
Qty: 30 TABLET | Refills: 1 | Status: SHIPPED | OUTPATIENT
Start: 2022-02-22

## 2022-02-22 RX ORDER — LIDOCAINE HYDROCHLORIDE 10 MG/ML
3 INJECTION, SOLUTION INFILTRATION; PERINEURAL
Status: COMPLETED | OUTPATIENT
Start: 2022-02-22 | End: 2022-02-22

## 2022-02-22 RX ADMIN — BUPIVACAINE HYDROCHLORIDE 1 ML: 2.5 INJECTION, SOLUTION INFILTRATION; PERINEURAL at 18:00

## 2022-02-22 RX ADMIN — LIDOCAINE HYDROCHLORIDE 3 ML: 10 INJECTION, SOLUTION INFILTRATION; PERINEURAL at 18:00

## 2022-02-22 RX ADMIN — Medication 20 MG: at 18:00

## 2022-02-22 NOTE — PROGRESS NOTES
Assessment/Plan:  Assessment/Plan   Diagnoses and all orders for this visit:    Primary osteoarthritis of left knee  -     Large joint arthrocentesis: L knee      25-year-old male with osteoarthritis of left knee with left knee pain more than 10 years duration  Clinical impression is that he is symptomatic from degenerative changes of the knee  He agreed to proceed with viscosupplementation  I administered 1st Euflexxa to left knee without complication  He will return in 1 week for 2nd injection  Subjective:   Patient ID: Ju Wagner is a 45 y o  male  Chief Complaint   Patient presents with    Left Knee - Follow-up, Pain       25-year-old male with osteoarthritis of left knee following up for left knee pain of more than 10 years duration  He was last seen by me 5 weeks ago which point he received left knee corticosteroid injection and we requested for Visco injection  He was referred to pain management for evaluation and consideration of genicular nerve block  Presents today for Visco injection  He states that following steroid injection he has significant improvement in pain  Physically active with work which involves lot of climbing up and down ladders, bending and twisting, and walking on uneven surfaces which aggravate his pain  He worked a lot this past weekend and pain flared up  Pain described as generalized to the knee but worse at the medial aspect, throbbing, nonradiating, worse with bearing weight, and improved with resting  Knee Pain  This is a chronic problem  The current episode started more than 1 year ago  The problem occurs daily  The problem has been waxing and waning  Associated symptoms include arthralgias and joint swelling  Pertinent negatives include no numbness or weakness  The symptoms are aggravated by standing, walking, twisting and bending  He has tried rest, position changes and NSAIDs (Corticosteroid injection) for the symptoms   The treatment provided mild relief  Review of Systems   Musculoskeletal: Positive for arthralgias and joint swelling  Neurological: Negative for weakness and numbness  Objective:  Vitals:    02/22/22 1749   BP: 164/99   Pulse: 77   Weight: (!) 137 kg (303 lb)   Height: 6' 2" (1 88 m)     Left Knee Exam     Other   Swelling: mild            Physical Exam  Vitals and nursing note reviewed  Constitutional:       General: He is not in acute distress  Appearance: He is well-developed  He is not ill-appearing or diaphoretic  HENT:      Head: Normocephalic and atraumatic  Right Ear: External ear normal       Left Ear: External ear normal    Eyes:      Conjunctiva/sclera: Conjunctivae normal    Neck:      Trachea: No tracheal deviation  Cardiovascular:      Rate and Rhythm: Normal rate  Pulmonary:      Effort: Pulmonary effort is normal  No respiratory distress  Abdominal:      General: There is no distension  Skin:     General: Skin is warm and dry  Coloration: Skin is not jaundiced or pale  Neurological:      Mental Status: He is alert and oriented to person, place, and time  Psychiatric:         Mood and Affect: Mood normal          Behavior: Behavior normal          Thought Content: Thought content normal          Judgment: Judgment normal              Large joint arthrocentesis: L knee  Universal Protocol:  Consent: Verbal consent obtained  Risks and benefits: risks, benefits and alternatives were discussed  Consent given by: patient  Time out: Immediately prior to procedure a "time out" was called to verify the correct patient, procedure, equipment, support staff and site/side marked as required    Patient understanding: patient states understanding of the procedure being performed  Patient consent: the patient's understanding of the procedure matches consent given  Procedure consent: procedure consent matches procedure scheduled  Relevant documents: relevant documents present and verified  Test results: test results available and properly labeled  Site marked: the operative site was marked  Radiology Images displayed and confirmed   If images not available, report reviewed: imaging studies available  Required items: required blood products, implants, devices, and special equipment available  Patient identity confirmed: verbally with patient    Supporting Documentation  Indications: pain   Procedure Details  Location: knee - L knee  Preparation: Patient was prepped and draped in the usual sterile fashion  Needle gauge: 21G 2"  Ultrasound guidance: no  Approach: anterolateral  Medications administered: 1 mL bupivacaine 0 25 %; 3 mL lidocaine 1 %; 20 mg Sodium Hyaluronate 20 MG/2ML  Specialty Pharmacy Supplied: received medications from pharmacy  Patient tolerance: patient tolerated the procedure well with no immediate complications  Dressing:  Sterile dressing applied

## 2022-02-23 ENCOUNTER — CONSULT (OUTPATIENT)
Dept: PAIN MEDICINE | Facility: CLINIC | Age: 52
End: 2022-02-23
Payer: COMMERCIAL

## 2022-02-23 VITALS
WEIGHT: 310 LBS | RESPIRATION RATE: 18 BRPM | SYSTOLIC BLOOD PRESSURE: 158 MMHG | DIASTOLIC BLOOD PRESSURE: 84 MMHG | HEIGHT: 74 IN | BODY MASS INDEX: 39.78 KG/M2 | HEART RATE: 67 BPM

## 2022-02-23 DIAGNOSIS — M17.12 PRIMARY OSTEOARTHRITIS OF LEFT KNEE: Primary | ICD-10-CM

## 2022-02-23 PROCEDURE — 99204 OFFICE O/P NEW MOD 45 MIN: CPT | Performed by: STUDENT IN AN ORGANIZED HEALTH CARE EDUCATION/TRAINING PROGRAM

## 2022-02-23 PROCEDURE — 3008F BODY MASS INDEX DOCD: CPT | Performed by: STUDENT IN AN ORGANIZED HEALTH CARE EDUCATION/TRAINING PROGRAM

## 2022-02-23 PROCEDURE — 1036F TOBACCO NON-USER: CPT | Performed by: STUDENT IN AN ORGANIZED HEALTH CARE EDUCATION/TRAINING PROGRAM

## 2022-02-23 RX ORDER — MELOXICAM 15 MG/1
15 TABLET ORAL DAILY PRN
Qty: 20 TABLET | Refills: 0 | Status: SHIPPED | OUTPATIENT
Start: 2022-02-23

## 2022-02-23 NOTE — PATIENT INSTRUCTIONS
Knee Pain   WHAT YOU NEED TO KNOW:   Knee pain may start suddenly, or it may be a long-term problem  You may have pain on the side, front, or back of your knee  You may have knee stiffness and swelling  You may hear popping sounds or feel like your knee is giving way or locking up as you walk  You may feel pain when you sit, stand, walk, or climb up and down stairs  Knee pain can be caused by conditions such as obesity, inflammation, or strains or tears in ligaments or tendons  DISCHARGE INSTRUCTIONS:   Return to the emergency department if:   · Your pain is worse, even after treatment  · You cannot bend or straighten your leg completely  · The swelling around your knee does not go down even with treatment  · Your knee is painful and hot to the touch  Contact your healthcare provider if:   · You have questions or concerns about your condition or care  Medicines: You may need any of the following:  · NSAIDs  help decrease swelling and pain or fever  This medicine is available with or without a doctor's order  NSAIDs can cause stomach bleeding or kidney problems in certain people  If you take blood thinner medicine, always ask your healthcare provider if NSAIDs are safe for you  Always read the medicine label and follow directions  · Acetaminophen  decreases pain and fever  It is available without a doctor's order  Ask how much to take and how often to take it  Follow directions  Read the labels of all other medicines you are using to see if they also contain acetaminophen, or ask your doctor or pharmacist  Acetaminophen can cause liver damage if not taken correctly  Do not use more than 4 grams (4,000 milligrams) total of acetaminophen in one day  · Prescription pain medicine  may be given  Ask your healthcare provider how to take this medicine safely  Some prescription pain medicines contain acetaminophen   Do not take other medicines that contain acetaminophen without talking to your healthcare provider  Too much acetaminophen may cause liver damage  Prescription pain medicine may cause constipation  Ask your healthcare provider how to prevent or treat constipation  · Take your medicine as directed  Contact your healthcare provider if you think your medicine is not helping or if you have side effects  Tell him or her if you are allergic to any medicine  Keep a list of the medicines, vitamins, and herbs you take  Include the amounts, and when and why you take them  Bring the list or the pill bottles to follow-up visits  Carry your medicine list with you in case of an emergency  What you can do to manage your symptoms:   · Rest your knee so it can heal   Limit activities that increase your pain  Do low-impact exercises, such as walking or swimming  · Apply ice to help reduce swelling and pain  Use an ice pack, or put crushed ice in a plastic bag  Cover it with a towel before you apply it to your knee  Apply ice for 15 to 20 minutes every hour, or as directed  · Apply compression to help reduce swelling  Use a brace or bandage only as directed  · Elevate your knee to help decrease pain and swelling  Elevate your knee while you are sitting or lying down  Prop your leg on pillows to keep your knee above the level of your heart  · Prevent your knee from moving as directed  Your healthcare provider may put on a cast or splint  You may need to wear a leg brace to stabilize your knee  A leg brace can be adjusted to increase your range of motion as your knee heals  What you can do to prevent knee pain:   · Maintain a healthy weight  Extra weight increases your risk for knee pain  Ask your healthcare provider how much you should weigh  He or she can help you create a safe weight loss plan if you need to lose weight  · Exercise or train properly  Use the correct equipment for sports  Wear shoes that provide good support   Check your posture often as you exercise, play sports, or train for an event  This can help prevent stress and strain on your knees  Rest between sessions so you do not overwork your knees  Follow up with your healthcare provider within 24 hours or as directed: You may need follow-up treatments, such as steroid injections to decrease pain  Write down your questions so you remember to ask them during your visits  © Copyright [employer] 2021 Information is for End User's use only and may not be sold, redistributed or otherwise used for commercial purposes  All illustrations and images included in CareNotes® are the copyrighted property of [employer]  or [facility] Mary Navarro   The above information is an  only  It is not intended as medical advice for individual conditions or treatments  Talk to your doctor, nurse or pharmacist before following any medical regimen to see if it is safe and effective for you

## 2022-02-23 NOTE — PROGRESS NOTES
Assessment  1  Primary osteoarthritis of left knee        Plan    This is a 45 year male who presents to our office with medial left knee pain secondary to advanced primary osteoarthritis  Has undergone corticosteroid with Orthopedics viscosupplementation with moderate degree of relief  Today we discussed left genicular nerve block and possible radiofrequency ablation  Will also order meloxicam 15mg daily prn to use instead of ibuprofen 800mg  Discussed this may help better with arthritic pain  He will schedule blocks after he undergoes current round of visco     Given the patient's symptoms, examination findings and imaging results noted above, I discussed the utility of proceeding with a diagnostic blockade of the left superior medial, superior lateral, and inferior medial genicular nerves under fluoroscopic guidance  Using an anatomical model, the procedure, as well as its potential risks, benefits, and reasonable alternatives were discussed in detail  Discussed risks of the procedure included but are not limited to bleeding, infection, allergic reaction, nerve damage, hematoma fomation, abscess formation, failure of the pain to improve and potential worsening of the pain  Since Mr Miguelina Lozoya has failed at least 6 weeks of conservative measures including over-the-counter pain medications, prescription medications and physical therapy it is reasonable to proceed with the above injection  The patient verbalized understanding to the potential risks, benefits, and reasonable alternatives to the above injection and wishes to proceed  His response will help to further determine a treatment plan  [place] Prescription Drug Monitoring Program report was reviewed and was appropriate     My impressions and treatment recommendations were discussed in detail with the patient who verbalized understanding and had no further questions  Discharge instructions were provided   I personally saw and examined the patient and I agree with the above discussed plan of care  No orders of the defined types were placed in this encounter  New Medications Ordered This Visit   Medications    meloxicam (MOBIC) 15 mg tablet     Sig: Take 1 tablet (15 mg total) by mouth daily as needed for moderate pain     Dispense:  20 tablet     Refill:  0       History of Present Illness    Referring Provider: YandellaverneDO Divine Mendoza is a 45 y o  male who presents with a chief complaint of left knee pain  This is a chronic issue for the last 10 years  Patient works as a   He has a quite active job, going up and down ladders    Pain is attributed to osteoarthritis  , severe over the past month, and rated as 7 to 8/10 and constant  During the past month, pain has been worse in the morning and night  Pain is described as burning, shooting, sharp, throbbing in nature  Pain is increased with lying down, standing, bending, sitting, walking, exercising, relaxation  No change with coughing, sneezing  Has undergone multiple corticosteroid and viscosupplementation injections with Orthopedics  Reports last visco injection was in April and lasted until [place]  Had his second round which started yesterday and will go for rest of the series int he coming weeks  Last steroid shot was 2 months ago- they last about 6 weeks  He is too young for knee replacement  No tobacco or marijuana  Drinks alcohol socially  Currently using ibuprofen, usually 800mg once at night  I have personally reviewed and/or updated the patient's past medical history, past surgical history, family history, social history, current medications, allergies, and vital signs today  Review of Systems   Constitutional: Negative for fever and unexpected weight change  HENT: Negative for trouble swallowing  Eyes: Negative for visual disturbance  Respiratory: Negative for shortness of breath and wheezing  Cardiovascular: Negative for chest pain and palpitations  Gastrointestinal: Negative for constipation, diarrhea, nausea and vomiting  Endocrine: Negative for cold intolerance, heat intolerance and polydipsia  Genitourinary: Negative for difficulty urinating and frequency  Musculoskeletal: Negative for arthralgias, gait problem, joint swelling and myalgias  Skin: Negative for rash  Neurological: Negative for dizziness, seizures, syncope, weakness and headaches  Hematological: Does not bruise/bleed easily  Psychiatric/Behavioral: Negative for dysphoric mood  All other systems reviewed and are negative  Patient Active Problem List   Diagnosis    Acid reflux    Elevated BP without diagnosis of hypertension    Hyperglycemia    Transaminitis    Enlarged liver    Fatty liver    Morbid obesity (HCC)    KRISTEN on CPAP    Gastroesophageal reflux disease    Perianal abscess    Personal history of Waldenstrom's macroglobulinemia    Erectile dysfunction    Partial thickness rotator cuff tear       Past Medical History:   Diagnosis Date    Basal cell carcinoma     face and chest    CPAP (continuous positive airway pressure) dependence     Essential hypertension 5/23/2018    GERD (gastroesophageal reflux disease)     Sleep apnea     uses Cpap    Sleep apnea, obstructive        Past Surgical History:   Procedure Laterality Date    ABCESS DRAINAGE      ambreen rectal abcess    MN ESOPHAGOGASTRODUODENOSCOPY TRANSORAL DIAGNOSTIC N/A 10/13/2018    Procedure: ESOPHAGOGASTRODUODENOSCOPY (EGD); Surgeon: Joy Murray MD;  Location: [facility];   Service: Gastroenterology    SKIN CANCER EXCISION         Family History   Problem Relation Age of Onset    Hypertension Mother     Hyperlipidemia Mother     Cancer Mother     Depression Mother     Anxiety disorder Mother     Skin cancer Mother     Hypertension Father     Hyperlipidemia Father     Coronary artery disease Father     Prostate cancer Paternal Grandfather     Prostate cancer Paternal Uncle     Colon cancer Neg Hx        Social History     Occupational History    Occupation: employed   Tobacco Use    Smoking status: Former Smoker     Packs/day: 0 25     Years: 23 00     Pack years: 5 75     Types: Cigarettes     Start date:      Quit date:      Years since quittin 1    Smokeless tobacco: Never Used    Tobacco comment: Smoked in college  Then was only a social smoker with a couple on weekends   Vaping Use    Vaping Use: Never used   Substance and Sexual Activity    Alcohol use: Yes     Comment: Cut back on drinking and now only drinks on the weekend   Drug use: No    Sexual activity: Not on file       Current Outpatient Medications on File Prior to Visit   Medication Sig    pantoprazole (PROTONIX) 40 mg tablet Take 1 tablet (40 mg total) by mouth daily    sildenafil (VIAGRA) 25 MG tablet TAKE 1-4 TABLETS BY MOUTH DAILY AS NEEDED FOR E D (TAKE 30 MINUTES - 1 HOUR PRIOR TO SEXUAL ACTIVITY)    Misc  Devices MISC Automatic blood pressure cuff to use daily as directed  Current Facility-Administered Medications on File Prior to Visit   Medication    [COMPLETED] bupivacaine (MARCAINE) 0 25 % injection 1 mL    [COMPLETED] lidocaine (XYLOCAINE) 1 % injection 3 mL    [COMPLETED] Sodium Hyaluronate 20 mg       Allergies   Allergen Reactions    Ciprofloxacin Hives       Physical Exam    /84   Pulse 67   Resp 18   Ht 6' 2" (1 88 m)   Wt (!) 141 kg (310 lb)   BMI 39 80 kg/m²     Constitutional: normal, well developed, well nourished, alert, in no distress and non-toxic and no overt pain behavior    Eyes: anicteric  HEENT: grossly intact  Neck: supple, symmetric, trachea midline and no masses   Pulmonary:even and unlabored  Cardiovascular:No edema or pitting edema present  Skin:Normal without rashes or lesions and well hydrated  Psychiatric:Mood and affect appropriate  Neurologic:Cranial Nerves II-XII grossly intact  Musculoskeletal: no significant swelling over the left knee  ttp over the medial portion of the knee    DIAGNOSTIC IMAGING AND TEST RESULTS:    LEFT KNEE            01/06/2021             INDICATION:  M17 0: Bilateral primary osteoarthritis of knee      COMPARISON:  None     VIEWS:  XR KNEE 3 VW LEFT NON INJURY      FINDINGS:     There is no acute fracture or dislocation      There is a small joint effusion      Mild osteoarthritis with small osteophytes seen      No lytic or blastic osseous lesion      Soft tissues are unremarkable      IMPRESSION:     No acute osseous abnormality      Mild osteoarthritis medial and patellofemoral compartments      MRI LEFT KNEE          03/15/2021     INDICATION:   S89  [id number]: Unspecified injury of left lower leg, subsequent encounter      COMPARISON: Plain film dated 1/6/2021     TECHNIQUE:    MR sequences were obtained of the left knee including:  Localizer, axial T2 fat sat, coronal T1/T2 fat sat, sagittal PD/T2 fat sat        Gadolinium was not used      FINDINGS:     SUBCUTANEOUS TISSUES: Normal     JOINT EFFUSION: Mild to moderate complex joint effusion with reactive synovitis      BAKER'S CYST: None      MENISCI: Mild blunting at the free edge of the medial meniscus best seen on series 7 image 23 consistent with focal degenerative tear  Lateral meniscus remains intact      CRUCIATE LIGAMENTS: Intact      EXTENSOR APPARATUS: Intact      COLLATERAL LIGAMENTS: Intact      ARTICULAR SURFACES: Tricompartmental degenerative change including extensive full-thickness cartilage loss weightbearing aspect medial femoral condyle with reactive subchondral marrow edema  Full-thickness cartilage fissuring at the median ridge best   seen on series 3 image 9  There is also full-thickness mid trochlear cartilage loss on series 3 image 11      BONES: Normal      MUSCULATURE:  Intact      IMPRESSION:  1    Advanced tricompartmental degenerative change including full-thickness cartilage loss weightbearing aspect of the medial femoral condyle and across the patellofemoral compartment as above  Associated mildly complex joint effusion with reactive   synovitis  2   Mild blunting at the free edge of the medial meniscus consistent with degenerative tear without flipped fragment    Lateral meniscus remains intact

## 2022-02-28 ENCOUNTER — TELEPHONE (OUTPATIENT)
Dept: OBGYN CLINIC | Facility: HOSPITAL | Age: 52
End: 2022-02-28

## 2022-02-28 NOTE — TELEPHONE ENCOUNTER
Dr Ana Conklin    Patient wife called back for [facility] S    Regarding tomorrows appt  Called Yahaira's line, no answer    I tried to reschedule, she would like to speak to [facility]

## 2022-03-01 ENCOUNTER — TELEPHONE (OUTPATIENT)
Dept: OBGYN CLINIC | Facility: HOSPITAL | Age: 52
End: 2022-03-01

## 2022-03-01 NOTE — TELEPHONE ENCOUNTER
He called back and I r/s him for tomorrow  Does the 3rd injection need to be rescheduled? It's for next Tuesday which will only be 6 days

## 2022-03-01 NOTE — TELEPHONE ENCOUNTER
I called the # provided and l/m for someone to please return my call  I was trying to work him in, in the time frame that was provided  Waiting for a call back

## 2022-03-01 NOTE — TELEPHONE ENCOUNTER
Patient sees Dr Kerry Jessica he is scheduled to be seen in the office at 545 pm today and is not able to make it in for his injection the patient is asking if he is able to come in for the injection between 11am-2 pm  The patient was called into work and cannot make it tonight but he is not wanting to miss this  Please advise          Call back# [phone]

## 2022-03-02 ENCOUNTER — PROCEDURE VISIT (OUTPATIENT)
Dept: OBGYN CLINIC | Facility: CLINIC | Age: 52
End: 2022-03-02
Payer: COMMERCIAL

## 2022-03-02 VITALS
HEIGHT: 74 IN | DIASTOLIC BLOOD PRESSURE: 80 MMHG | SYSTOLIC BLOOD PRESSURE: 149 MMHG | HEART RATE: 69 BPM | BODY MASS INDEX: 39.78 KG/M2 | WEIGHT: 310 LBS

## 2022-03-02 DIAGNOSIS — M17.12 PRIMARY OSTEOARTHRITIS OF LEFT KNEE: Primary | ICD-10-CM

## 2022-03-02 PROCEDURE — 20610 DRAIN/INJ JOINT/BURSA W/O US: CPT | Performed by: FAMILY MEDICINE

## 2022-03-02 RX ORDER — HYALURONATE SODIUM 10 MG/ML
20 SYRINGE (ML) INTRAARTICULAR
Status: COMPLETED | OUTPATIENT
Start: 2022-03-02 | End: 2022-03-02

## 2022-03-02 RX ORDER — BUPIVACAINE HYDROCHLORIDE 2.5 MG/ML
1 INJECTION, SOLUTION INFILTRATION; PERINEURAL
Status: COMPLETED | OUTPATIENT
Start: 2022-03-02 | End: 2022-03-02

## 2022-03-02 RX ORDER — LIDOCAINE HYDROCHLORIDE 10 MG/ML
3 INJECTION, SOLUTION INFILTRATION; PERINEURAL
Status: COMPLETED | OUTPATIENT
Start: 2022-03-02 | End: 2022-03-02

## 2022-03-02 RX ADMIN — LIDOCAINE HYDROCHLORIDE 3 ML: 10 INJECTION, SOLUTION INFILTRATION; PERINEURAL at 08:08

## 2022-03-02 RX ADMIN — Medication 20 MG: at 08:08

## 2022-03-02 RX ADMIN — BUPIVACAINE HYDROCHLORIDE 1 ML: 2.5 INJECTION, SOLUTION INFILTRATION; PERINEURAL at 08:08

## 2022-03-02 NOTE — PROGRESS NOTES
Assessment/Plan:  Assessment/Plan   Diagnoses and all orders for this visit:    Primary osteoarthritis of left knee  -     Large joint arthrocentesis: L knee        14-year-old male with osteoarthritis of left knee with left knee pain of more than 10 years duration  Clinical impression is that he is symptomatic from degenerative changes of the knee  He agreed to proceed with viscosupplementation  I administered 2nd Euflexxa to the left knee without complication  He will return in 1 week for 3rd injection  Subjective:   Patient ID: Kristal Villanueva is a 45 y o  male  Chief Complaint   Patient presents with    Left Knee - Pain, Follow-up       14-year-old male with osteoarthritis of left knee following up for left knee pain more than 10 years duration  He was last seen by me 1 week ago which point he received 1st Euflexxa injection  He reports experiencing increased pain for few days, and then intense pain subsided  Pain described as generalized to the knee but worse at the medial aspect, throbbing, nonradiating, worse with bearing weight, and improved with resting  Knee Pain  This is a chronic problem  The current episode started more than 1 year ago  The problem occurs daily  The problem has been waxing and waning  Associated symptoms include arthralgias  Pertinent negatives include no joint swelling, numbness or weakness  The symptoms are aggravated by standing and walking  He has tried rest and NSAIDs (Viscosupplementation) for the symptoms  The treatment provided mild relief  Review of Systems   Musculoskeletal: Positive for arthralgias  Negative for joint swelling  Neurological: Negative for weakness and numbness  Objective:  Vitals:    03/02/22 0759   BP: 149/80   Pulse: 69   Weight: (!) 141 kg (310 lb)   Height: 6' 2" (1 88 m)     Left Knee Exam     Range of Motion   Extension: normal     Other   Swelling: none            Physical Exam  Vitals and nursing note reviewed  Constitutional:       General: He is not in acute distress  Appearance: He is well-developed  He is not ill-appearing or diaphoretic  HENT:      Head: Normocephalic and atraumatic  Right Ear: External ear normal       Left Ear: External ear normal    Eyes:      Conjunctiva/sclera: Conjunctivae normal    Neck:      Trachea: No tracheal deviation  Cardiovascular:      Rate and Rhythm: Normal rate  Pulmonary:      Effort: Pulmonary effort is normal  No respiratory distress  Abdominal:      General: There is no distension  Skin:     General: Skin is warm and dry  Coloration: Skin is not jaundiced or pale  Neurological:      Mental Status: He is alert and oriented to person, place, and time  Psychiatric:         Mood and Affect: Mood normal          Behavior: Behavior normal          Thought Content: Thought content normal          Judgment: Judgment normal          Large joint arthrocentesis: L knee  Universal Protocol:  Consent: Verbal consent obtained  Risks and benefits: risks, benefits and alternatives were discussed  Consent given by: patient  Time out: Immediately prior to procedure a "time out" was called to verify the correct patient, procedure, equipment, support staff and site/side marked as required  Patient understanding: patient states understanding of the procedure being performed  Patient consent: the patient's understanding of the procedure matches consent given  Procedure consent: procedure consent matches procedure scheduled  Relevant documents: relevant documents present and verified  Test results: test results available and properly labeled  Site marked: the operative site was marked  Radiology Images displayed and confirmed   If images not available, report reviewed: imaging studies available  Required items: required blood products, implants, devices, and special equipment available  Patient identity confirmed: verbally with patient    Supporting Documentation  Indications: pain   Procedure Details  Location: knee - L knee  Preparation: Patient was prepped and draped in the usual sterile fashion  Needle gauge: 21G 2"  Ultrasound guidance: no  Approach: anterolateral  Medications administered: 1 mL bupivacaine 0 25 %; 3 mL lidocaine 1 %; 20 mg Sodium Hyaluronate 20 MG/2ML  Specialty Pharmacy Supplied: received medications from pharmacy  Patient tolerance: patient tolerated the procedure well with no immediate complications  Dressing:  Sterile dressing applied

## 2022-03-08 ENCOUNTER — OFFICE VISIT (OUTPATIENT)
Dept: FAMILY MEDICINE CLINIC | Facility: CLINIC | Age: 52
End: 2022-03-08
Payer: COMMERCIAL

## 2022-03-08 VITALS
BODY MASS INDEX: 40.43 KG/M2 | SYSTOLIC BLOOD PRESSURE: 136 MMHG | OXYGEN SATURATION: 97 % | DIASTOLIC BLOOD PRESSURE: 89 MMHG | WEIGHT: 315 LBS | HEIGHT: 74 IN | HEART RATE: 78 BPM | TEMPERATURE: 98.4 F

## 2022-03-08 VITALS
WEIGHT: 315 LBS | SYSTOLIC BLOOD PRESSURE: 164 MMHG | HEART RATE: 82 BPM | DIASTOLIC BLOOD PRESSURE: 89 MMHG | BODY MASS INDEX: 40.43 KG/M2 | HEIGHT: 74 IN

## 2022-03-08 DIAGNOSIS — Z12.5 SCREENING FOR PROSTATE CANCER: ICD-10-CM

## 2022-03-08 DIAGNOSIS — F41.9 ANXIETY: ICD-10-CM

## 2022-03-08 DIAGNOSIS — F10.20 UNCOMPLICATED ALCOHOL DEPENDENCE (HCC): Primary | ICD-10-CM

## 2022-03-08 DIAGNOSIS — Z13.1 SCREENING FOR DIABETES MELLITUS: ICD-10-CM

## 2022-03-08 DIAGNOSIS — Z13.220 SCREENING FOR LIPID DISORDERS: ICD-10-CM

## 2022-03-08 DIAGNOSIS — E66.01 CLASS 3 SEVERE OBESITY DUE TO EXCESS CALORIES WITH SERIOUS COMORBIDITY AND BODY MASS INDEX (BMI) OF 40.0 TO 44.9 IN ADULT (HCC): ICD-10-CM

## 2022-03-08 DIAGNOSIS — M17.12 PRIMARY OSTEOARTHRITIS OF LEFT KNEE: Primary | ICD-10-CM

## 2022-03-08 PROBLEM — E66.813 CLASS 3 SEVERE OBESITY DUE TO EXCESS CALORIES WITH SERIOUS COMORBIDITY AND BODY MASS INDEX (BMI) OF 40.0 TO 44.9 IN ADULT (HCC): Status: ACTIVE | Noted: 2017-03-31

## 2022-03-08 PROCEDURE — 99214 OFFICE O/P EST MOD 30 MIN: CPT | Performed by: PHYSICIAN ASSISTANT

## 2022-03-08 PROCEDURE — 1036F TOBACCO NON-USER: CPT | Performed by: PHYSICIAN ASSISTANT

## 2022-03-08 PROCEDURE — 3008F BODY MASS INDEX DOCD: CPT | Performed by: PHYSICIAN ASSISTANT

## 2022-03-08 PROCEDURE — 20610 DRAIN/INJ JOINT/BURSA W/O US: CPT | Performed by: FAMILY MEDICINE

## 2022-03-08 RX ORDER — HYALURONATE SODIUM 10 MG/ML
20 SYRINGE (ML) INTRAARTICULAR
Status: COMPLETED | OUTPATIENT
Start: 2022-03-08 | End: 2022-03-08

## 2022-03-08 RX ORDER — BUPIVACAINE HYDROCHLORIDE 2.5 MG/ML
1 INJECTION, SOLUTION INFILTRATION; PERINEURAL
Status: COMPLETED | OUTPATIENT
Start: 2022-03-08 | End: 2022-03-08

## 2022-03-08 RX ORDER — LIDOCAINE HYDROCHLORIDE 10 MG/ML
3 INJECTION, SOLUTION INFILTRATION; PERINEURAL
Status: COMPLETED | OUTPATIENT
Start: 2022-03-08 | End: 2022-03-08

## 2022-03-08 RX ADMIN — BUPIVACAINE HYDROCHLORIDE 1 ML: 2.5 INJECTION, SOLUTION INFILTRATION; PERINEURAL at 17:30

## 2022-03-08 RX ADMIN — Medication 20 MG: at 17:30

## 2022-03-08 RX ADMIN — LIDOCAINE HYDROCHLORIDE 3 ML: 10 INJECTION, SOLUTION INFILTRATION; PERINEURAL at 17:30

## 2022-03-08 NOTE — PATIENT INSTRUCTIONS
[facility]   Phone: [phone]    [place] [facility])   Phone: [phone]  [facility], [address] [place] [address] Hadley HsiehVulcan   Phone: [phone] [address] [address]   [facility], [address]

## 2022-03-08 NOTE — PROGRESS NOTES
Assessment/Plan:  Assessment/Plan   Diagnoses and all orders for this visit:    Primary osteoarthritis of left knee  -     Large joint arthrocentesis: L knee      65-year-old male with osteoarthritis of left knee with left knee pain more than 10 years duration  Clinical impression is that he is symptomatic from degenerative changes of the knee  He agreed to proceed with viscosupplementation  I administered 3rd Euflexxa to the left knee without complication  He is to continue taking meloxicam as prescribed by pain management  Recommend he follow-up with pain management guarding moving forward with genicular nerve block  He will follow up as needed  Subjective:   Patient ID: Beau Crews is a 45 y o  male  Chief Complaint   Patient presents with    Left Knee - Follow-up       65-year-old male with osteoarthritis of left knee following up for left knee pain more than 10 years duration  He was last seen by me last week at which point he received 2nd flexion injection  He has been taking meloxicam 15 mg once daily as prescribed by pain management and reports that with combination of meloxicam and Visco injections pain has been subsiding  He has no longer experiencing intense pain of knee but has symptoms that are still bothersome  He has pain described as generalized knee but worse at the anterior and medial aspects, achy and throbbing, worse with prolonged standing and ambulation, worse with twisting, associated with swelling, and improved with resting from physical activity  He does report that after driving for long distances his knee can sometimes lock, and with extending his knee, his kneecap pops causing intense pain and then it rapidly subsides  Knee Pain  This is a chronic problem  The current episode started more than 1 year ago  The problem occurs daily  The problem has been gradually improving  Associated symptoms include arthralgias and joint swelling   Pertinent negatives include no numbness or weakness  The symptoms are aggravated by standing and walking  He has tried rest and NSAIDs (Viscosupplementation) for the symptoms  The treatment provided mild relief  Review of Systems   Musculoskeletal: Positive for arthralgias and joint swelling  Neurological: Negative for weakness and numbness  Objective:  Vitals:    03/08/22 1722   BP: 164/89   Pulse: 82   Weight: (!) 145 kg (320 lb)   Height: 6' 2" (1 88 m)     Left Knee Exam     Other   Swelling: mild            Physical Exam  Vitals and nursing note reviewed  Constitutional:       General: He is not in acute distress  Appearance: He is well-developed  He is not ill-appearing or diaphoretic  HENT:      Head: Normocephalic and atraumatic  Right Ear: External ear normal       Left Ear: External ear normal    Eyes:      Conjunctiva/sclera: Conjunctivae normal    Neck:      Trachea: No tracheal deviation  Cardiovascular:      Rate and Rhythm: Normal rate  Pulmonary:      Effort: Pulmonary effort is normal  No respiratory distress  Abdominal:      General: There is no distension  Musculoskeletal:         General: Swelling present  Skin:     General: Skin is warm and dry  Coloration: Skin is not jaundiced or pale  Neurological:      Mental Status: He is alert and oriented to person, place, and time  Psychiatric:         Mood and Affect: Mood normal          Behavior: Behavior normal          Thought Content: Thought content normal          Judgment: Judgment normal          Large joint arthrocentesis: L knee  Universal Protocol:  Consent: Verbal consent obtained  Risks and benefits: risks, benefits and alternatives were discussed  Consent given by: patient  Time out: Immediately prior to procedure a "time out" was called to verify the correct patient, procedure, equipment, support staff and site/side marked as required    Patient understanding: patient states understanding of the procedure being performed  Patient consent: the patient's understanding of the procedure matches consent given  Procedure consent: procedure consent matches procedure scheduled  Relevant documents: relevant documents present and verified  Test results: test results available and properly labeled  Site marked: the operative site was marked  Radiology Images displayed and confirmed   If images not available, report reviewed: imaging studies available  Required items: required blood products, implants, devices, and special equipment available  Patient identity confirmed: verbally with patient    Supporting Documentation  Indications: pain   Procedure Details  Location: knee - L knee  Preparation: Patient was prepped and draped in the usual sterile fashion  Needle gauge: 21G 2"  Ultrasound guidance: no  Approach: anterolateral  Medications administered: 1 mL bupivacaine 0 25 %; 3 mL lidocaine 1 %; 20 mg Sodium Hyaluronate 20 MG/2ML  Specialty Pharmacy Supplied: received medications from pharmacy  Patient tolerance: patient tolerated the procedure well with no immediate complications  Dressing:  Sterile dressing applied

## 2022-03-08 NOTE — PROGRESS NOTES
Assessment/Plan:       Problem List Items Addressed This Visit        Other    Class 3 severe obesity due to excess calories with serious comorbidity and body mass index (BMI) of 40 0 to 44 9 in adult [facility])    Uncomplicated alcohol dependence ([address] ) - Primary    Relevant Orders    Basic metabolic panel    Hepatic function panel    Gamma GT    US abdomen complete    Anxiety      Other Visit Diagnoses     Screening for diabetes mellitus        Relevant Orders    HEMOGLOBIN A1C W/ EAG ESTIMATION    Screening for lipid disorders        Relevant Orders    Lipid Panel with Direct LDL reflex    Screening for prostate cancer        Relevant Orders    PSA, Total Screen        today we had a long discussion regarding his alcohol dependence  He is motivated to stop drinking  Discussed given the amount of consumption and risks of withdrawal he could be a candidate for admission to detox unit, pt declines  He is interested in community resources and medication assistance to quit drinking  I discussed benzodiazepine tapers, counseling and routine follow ups  Information given about 3 local [facility] centers  Will order labs and US as above  2 week follow up  Pt agreeable to plan  Discussed possible complications of withdrawal in an uncontrolled environment including seizures and death, and pt is understanding of what to look out for an ER precautions  Subjective:      Patient ID: Sara Barros is a 45 y o  male  Pt presents to establish care  His main concern today is alcoholism  He has been drinking since the age of 14  He can drink more than a fifth of vodka daily  He can however go a few days without drinking anything  He has severe anxiety, when he is not drinking, he becomes very anxious and turns to alcohol  When he drinks he also shares he experinces anger and irritability  He is ready to detox and quit drinking   He shares he has never detoxed in the past  He noted mild withdrawal sx when stopping cold turkey in the past  Denies seizure hx  Denies abdominal pain, N/V/D, black or bloody stools, syncope or confusion  Otherwise, his PMH is limited, he took amlodipine for HTN for a few months  Lost weight and has been off of it  BP on recheck is 136/89  No CP, SOB, RICKETTS  Does note LE edema at times  Previous smoker  No drug use  Works as an       The following portions of the patient's history were reviewed and updated as appropriate:   He  has a past medical history of Basal cell carcinoma, CPAP (continuous positive airway pressure) dependence, Essential hypertension (5/23/2018), GERD (gastroesophageal reflux disease), Sleep apnea, and Sleep apnea, obstructive  He   Patient Active Problem List    Diagnosis Date Noted    Uncomplicated [address] dependence [address] [address] ) 03/08/2022    Anxiety 03/08/2022    Partial thickness rotator cuff tear 11/23/2021    Erectile dysfunction 08/10/2021    Personal history of Waldenstrom's macroglobulinemia 07/16/2021    Perianal abscess 03/18/2021    Gastroesophageal reflux disease 09/20/2018    Acid reflux 03/31/2017    Class 3 severe obesity due to excess calories with serious comorbidity and body mass index (BMI) of 40 0 to 44 9 in adult [facility]) 03/31/2017    Enlarged liver 03/21/2017    Fatty liver 03/21/2017    Hyperglycemia 02/14/2017    Transaminitis 02/14/2017    Elevated BP without diagnosis of hypertension 02/09/2017    KRISTEN on CPAP 02/09/2017     He  has a past surgical history that includes Skin cancer excision; Abscess drainage; and pr esophagogastroduodenoscopy transoral diagnostic (N/A, 10/13/2018)  His family history includes Anxiety disorder in his mother; Cancer in his mother; Coronary artery disease in his father; Depression in his mother; Hyperlipidemia in his father and mother; Hypertension in his father and mother; Prostate cancer in his paternal grandfather and paternal uncle; Skin cancer in his mother  He  reports that he quit smoking about 9 years ago   His smoking use included cigarettes  He started smoking about 32 years ago  He has a 5 75 pack-year smoking history  He has never used smokeless tobacco  He reports current alcohol use  He reports that he does not use drugs  Current Outpatient Medications   Medication Sig Dispense Refill    meloxicam (MOBIC) 15 mg tablet Take 1 tablet (15 mg total) by mouth daily as needed for moderate pain 20 tablet 0    pantoprazole (PROTONIX) 40 mg tablet Take 1 tablet (40 mg total) by mouth daily 90 tablet 3    Misc  Devices MISC Automatic blood pressure cuff to use daily as directed  1 each 0    sildenafil (VIAGRA) 25 MG tablet TAKE 1-4 TABLETS BY MOUTH DAILY AS NEEDED FOR E D (TAKE 30 MINUTES - 1 HOUR PRIOR TO SEXUAL ACTIVITY) 30 tablet 1     No current facility-administered medications for this visit  Current Outpatient Medications on File Prior to Visit   Medication Sig    meloxicam (MOBIC) 15 mg tablet Take 1 tablet (15 mg total) by mouth daily as needed for moderate pain    pantoprazole (PROTONIX) 40 mg tablet Take 1 tablet (40 mg total) by mouth daily    Misc  Devices MISC Automatic blood pressure cuff to use daily as directed   sildenafil (VIAGRA) 25 MG tablet TAKE 1-4 TABLETS BY MOUTH DAILY AS NEEDED FOR E D (TAKE 30 MINUTES - 1 HOUR PRIOR TO SEXUAL ACTIVITY)     No current facility-administered medications on file prior to visit  He is allergic to ciprofloxacin       Review of Systems   Constitutional: Negative for chills, fatigue and fever  HENT: Negative for congestion, ear pain, hearing loss, nosebleeds, postnasal drip, rhinorrhea, sinus pressure, sinus pain, sneezing and sore throat  Eyes: Negative for pain, discharge, itching and visual disturbance  Respiratory: Negative for cough, chest tightness, shortness of breath and wheezing  Cardiovascular: Negative for chest pain, palpitations and leg swelling     Gastrointestinal: Negative for abdominal pain, blood in stool, constipation, diarrhea, nausea and vomiting  Genitourinary: Negative for frequency and urgency  Musculoskeletal: Positive for arthralgias (L knee pain, follows with ortho)  Neurological: Negative for dizziness, light-headedness and numbness  Psychiatric/Behavioral: Positive for sleep disturbance  The patient is nervous/anxious  Objective:      /89 (BP Location: Left arm, Patient Position: Sitting, Cuff Size: Large)   Pulse 78   Temp 98 4 °F (36 9 °C)   Ht 6' 2" (1 88 m)   Wt (!) 145 kg (320 lb)   SpO2 97%   BMI 41 09 kg/m²          Physical Exam  Vitals and nursing note reviewed  Constitutional:       General: He is not in acute distress  Appearance: Normal appearance  HENT:      Head: Normocephalic and atraumatic  Nose: Nose normal    Eyes:      Pupils: Pupils are equal, round, and reactive to light  Cardiovascular:      Rate and Rhythm: Normal rate and regular rhythm  Heart sounds: Normal heart sounds  No murmur heard  Pulmonary:      Effort: Pulmonary effort is normal  No respiratory distress  Breath sounds: Normal breath sounds  No wheezing, rhonchi or rales  Abdominal:      General: Bowel sounds are normal  There is no distension  Palpations: Abdomen is soft  There is no fluid wave or mass  Tenderness: There is no abdominal tenderness  There is no guarding  Hernia: No hernia is present  Musculoskeletal:         General: Normal range of motion  Cervical back: Normal range of motion and neck supple  Right lower leg: Edema (1+) present  Left lower leg: Edema (1+) present  Skin:     General: Skin is warm and dry  Neurological:      Mental Status: He is alert and oriented to person, place, and time     Psychiatric:         Mood and Affect: Mood and affect normal

## 2022-04-28 ENCOUNTER — TELEPHONE (OUTPATIENT)
Dept: PAIN MEDICINE | Facility: CLINIC | Age: 52
End: 2022-04-28

## 2022-04-28 NOTE — TELEPHONE ENCOUNTER
S/W wife Katharine per AKIKO  Pt will move forward with Genicular NB of left knee  Please place order    Made wife aware of process and need for approval from ins co prior to scheduling  Will cancel today's appt  Pt called in as well, made him aware of plan

## 2022-04-28 NOTE — TELEPHONE ENCOUNTER
Can pt have Left Genicular Nerve Block without a ? S/w pt, advised a  is required for procedure and pt states it is hard to get

## 2022-04-28 NOTE — TELEPHONE ENCOUNTER
It looks like genicular knee blocks were discussed at his consult with Dr Zunilda Salgado, however they were not ordered  I am not sure if the patient wanted to consider that procedure, that is what I would discuss with him at this appointment  Since Dr Zunilda Salgado already discussed this procedure with the patient his consult, if he wants to proceed with this procedure I can place the order for genicular knee blocks if he does not want to come to his appointment today

## 2022-04-28 NOTE — TELEPHONE ENCOUNTER
Patient's wife Carmen Cousins would like to know the reason for his OVS today & if it can be virtual, due to work schedule  She assumed it was for a procedure, but appointment note states next plan of care   Please reach out to her at # [phone]

## 2022-04-29 NOTE — TELEPHONE ENCOUNTER
S/w pt and scheduled Left genicular nerve block for 5/3/22 245 pm arrival  Gave pre procedure instructions

## 2022-05-03 NOTE — TELEPHONE ENCOUNTER
Pt's wife called asking if procedure was covered  Advised that insurance was verified and procedure is valid and billable and would be covered $450 ded 80/20  She wants to cxl procedure for today and will call back to reschedule when pt will have  for the procedure

## 2022-06-20 DIAGNOSIS — K21.9 GASTROESOPHAGEAL REFLUX DISEASE WITHOUT ESOPHAGITIS: ICD-10-CM

## 2022-06-20 RX ORDER — PANTOPRAZOLE SODIUM 40 MG/1
40 TABLET, DELAYED RELEASE ORAL DAILY
Qty: 90 TABLET | Refills: 3 | Status: SHIPPED | OUTPATIENT
Start: 2022-06-20

## 2022-08-10 ENCOUNTER — TELEPHONE (OUTPATIENT)
Dept: PAIN MEDICINE | Facility: CLINIC | Age: 52
End: 2022-08-10

## 2023-03-03 ENCOUNTER — TELEPHONE (OUTPATIENT)
Dept: FAMILY MEDICINE CLINIC | Facility: MEDICAL CENTER | Age: 53
End: 2023-03-03

## 2023-03-03 NOTE — TELEPHONE ENCOUNTER
Incoming fax from [facility] for [place] requesting records forwarded to [facility] for processing

## 2025-02-13 ENCOUNTER — TELEPHONE (OUTPATIENT)
Dept: GASTROENTEROLOGY | Facility: CLINIC | Age: 55
End: 2025-02-13